# Patient Record
Sex: MALE | Race: WHITE | Employment: FULL TIME
[De-identification: names, ages, dates, MRNs, and addresses within clinical notes are randomized per-mention and may not be internally consistent; named-entity substitution may affect disease eponyms.]

---

## 2017-02-21 ENCOUNTER — TELEPHONE (OUTPATIENT)
Dept: FAMILY MEDICINE CLINIC | Facility: CLINIC | Age: 52
End: 2017-02-21

## 2017-02-24 RX ORDER — AMOXICILLIN 875 MG/1
875 TABLET, COATED ORAL 2 TIMES DAILY
Qty: 20 TABLET | Refills: 0 | Status: SHIPPED | OUTPATIENT
Start: 2017-02-24 | End: 2020-04-30 | Stop reason: SDUPTHER

## 2017-07-19 ENCOUNTER — OFFICE VISIT (OUTPATIENT)
Dept: FAMILY MEDICINE CLINIC | Age: 52
End: 2017-07-19
Payer: COMMERCIAL

## 2017-07-19 VITALS
RESPIRATION RATE: 18 BRPM | HEIGHT: 72 IN | TEMPERATURE: 98.6 F | WEIGHT: 203 LBS | HEART RATE: 80 BPM | DIASTOLIC BLOOD PRESSURE: 92 MMHG | BODY MASS INDEX: 27.5 KG/M2 | SYSTOLIC BLOOD PRESSURE: 117 MMHG

## 2017-07-19 DIAGNOSIS — F41.9 ANXIETY: ICD-10-CM

## 2017-07-19 DIAGNOSIS — Z13.220 SCREENING FOR LIPOID DISORDERS: ICD-10-CM

## 2017-07-19 DIAGNOSIS — R06.02 SOB (SHORTNESS OF BREATH): ICD-10-CM

## 2017-07-19 DIAGNOSIS — I49.9 IRREGULAR HEARTBEAT: Primary | ICD-10-CM

## 2017-07-19 DIAGNOSIS — Z11.59 NEED FOR HEPATITIS C SCREENING TEST: ICD-10-CM

## 2017-07-19 DIAGNOSIS — R09.89 ABNORMAL LUNG SOUNDS: ICD-10-CM

## 2017-07-19 DIAGNOSIS — Z12.5 SPECIAL SCREENING FOR MALIGNANT NEOPLASM OF PROSTATE: ICD-10-CM

## 2017-07-19 PROCEDURE — 99214 OFFICE O/P EST MOD 30 MIN: CPT | Performed by: NURSE PRACTITIONER

## 2017-07-19 PROCEDURE — 93000 ELECTROCARDIOGRAM COMPLETE: CPT | Performed by: NURSE PRACTITIONER

## 2017-07-19 RX ORDER — LORAZEPAM 0.5 MG/1
0.5 TABLET ORAL EVERY 8 HOURS PRN
Qty: 10 TABLET | Refills: 0 | Status: SHIPPED | OUTPATIENT
Start: 2017-07-19 | End: 2017-08-18

## 2017-07-19 RX ORDER — FLUTICASONE PROPIONATE 50 MCG
1 SPRAY, SUSPENSION (ML) NASAL DAILY
Qty: 3 BOTTLE | Refills: 3 | Status: SHIPPED | OUTPATIENT
Start: 2017-07-19 | End: 2020-10-22 | Stop reason: SDUPTHER

## 2017-07-19 RX ORDER — SILODOSIN 4 MG/1
4 CAPSULE ORAL DAILY
Qty: 90 CAPSULE | Refills: 3 | Status: SHIPPED | OUTPATIENT
Start: 2017-07-19 | End: 2018-04-16 | Stop reason: DRUGHIGH

## 2017-07-19 ASSESSMENT — ENCOUNTER SYMPTOMS
SHORTNESS OF BREATH: 0
CHEST TIGHTNESS: 0
VOMITING: 0
COUGH: 0
NAUSEA: 0
WHEEZING: 0
ABDOMINAL PAIN: 0

## 2017-08-30 LAB
ALBUMIN SERPL-MCNC: 4.1 G/DL
ALP BLD-CCNC: 102 U/L
ALT SERPL-CCNC: 29 U/L
ANION GAP SERPL CALCULATED.3IONS-SCNC: NORMAL MMOL/L
AST SERPL-CCNC: 22 U/L
BASOPHILS ABSOLUTE: NORMAL /ΜL
BASOPHILS RELATIVE PERCENT: NORMAL %
BILIRUB SERPL-MCNC: 0.6 MG/DL (ref 0.1–1.4)
BUN BLDV-MCNC: 14 MG/DL
CALCIUM SERPL-MCNC: 9.4 MG/DL
CHLORIDE BLD-SCNC: 105 MMOL/L
CHOLESTEROL, TOTAL: 198 MG/DL
CHOLESTEROL/HDL RATIO: 4.1
CO2: 29 MMOL/L
CREAT SERPL-MCNC: 0.94 MG/DL
EOSINOPHILS ABSOLUTE: NORMAL /ΜL
EOSINOPHILS RELATIVE PERCENT: NORMAL %
GFR CALCULATED: NORMAL
GLUCOSE BLD-MCNC: 98 MG/DL
HCT VFR BLD CALC: NORMAL % (ref 41–53)
HDLC SERPL-MCNC: 48 MG/DL (ref 35–70)
HEMOGLOBIN: NORMAL G/DL (ref 13.5–17.5)
LDL CHOLESTEROL CALCULATED: 119 MG/DL (ref 0–160)
LYMPHOCYTES ABSOLUTE: NORMAL /ΜL
LYMPHOCYTES RELATIVE PERCENT: NORMAL %
MCH RBC QN AUTO: NORMAL PG
MCHC RBC AUTO-ENTMCNC: NORMAL G/DL
MCV RBC AUTO: NORMAL FL
MONOCYTES ABSOLUTE: NORMAL /ΜL
MONOCYTES RELATIVE PERCENT: NORMAL %
NEUTROPHILS ABSOLUTE: NORMAL /ΜL
NEUTROPHILS RELATIVE PERCENT: NORMAL %
PDW BLD-RTO: NORMAL %
PLATELET # BLD: NORMAL K/ΜL
PMV BLD AUTO: NORMAL FL
POTASSIUM SERPL-SCNC: 4.6 MMOL/L
PROSTATE SPECIFIC ANTIGEN: 1.7 NG/ML
RBC # BLD: NORMAL 10^6/ΜL
SODIUM BLD-SCNC: 139 MMOL/L
TOTAL PROTEIN: 6.5
TRIGL SERPL-MCNC: 193 MG/DL
VLDLC SERPL CALC-MCNC: NORMAL MG/DL
WBC # BLD: NORMAL 10^3/ML

## 2018-04-16 ENCOUNTER — OFFICE VISIT (OUTPATIENT)
Dept: FAMILY MEDICINE CLINIC | Age: 53
End: 2018-04-16
Payer: COMMERCIAL

## 2018-04-16 VITALS
BODY MASS INDEX: 28.58 KG/M2 | HEART RATE: 78 BPM | SYSTOLIC BLOOD PRESSURE: 118 MMHG | DIASTOLIC BLOOD PRESSURE: 84 MMHG | TEMPERATURE: 98.2 F | HEIGHT: 72 IN | OXYGEN SATURATION: 97 % | WEIGHT: 211 LBS

## 2018-04-16 DIAGNOSIS — Z23 NEED FOR TDAP VACCINATION: ICD-10-CM

## 2018-04-16 DIAGNOSIS — K64.4 EXTERNAL HEMORRHOIDS WITHOUT COMPLICATION: Primary | ICD-10-CM

## 2018-04-16 DIAGNOSIS — N40.0 BENIGN PROSTATIC HYPERPLASIA, UNSPECIFIED WHETHER LOWER URINARY TRACT SYMPTOMS PRESENT: ICD-10-CM

## 2018-04-16 DIAGNOSIS — N52.9 ERECTILE DYSFUNCTION, UNSPECIFIED ERECTILE DYSFUNCTION TYPE: ICD-10-CM

## 2018-04-16 PROCEDURE — 90471 IMMUNIZATION ADMIN: CPT | Performed by: NURSE PRACTITIONER

## 2018-04-16 PROCEDURE — 90715 TDAP VACCINE 7 YRS/> IM: CPT | Performed by: NURSE PRACTITIONER

## 2018-04-16 PROCEDURE — 99213 OFFICE O/P EST LOW 20 MIN: CPT | Performed by: NURSE PRACTITIONER

## 2018-04-16 RX ORDER — SILODOSIN 8 MG/1
8 CAPSULE ORAL EVERY EVENING
Qty: 90 CAPSULE | Refills: 3 | Status: SHIPPED | OUTPATIENT
Start: 2018-04-16 | End: 2018-06-26 | Stop reason: SDUPTHER

## 2018-04-16 RX ORDER — DIAPER,BRIEF,INFANT-TODD,DISP
EACH MISCELLANEOUS
Qty: 1 TUBE | Refills: 1 | Status: SHIPPED | OUTPATIENT
Start: 2018-04-16 | End: 2018-04-23

## 2018-04-16 ASSESSMENT — ENCOUNTER SYMPTOMS
SHORTNESS OF BREATH: 0
DIARRHEA: 0
ABDOMINAL DISTENTION: 0
ANAL BLEEDING: 1
CONSTIPATION: 0
COUGH: 0
VOMITING: 0
ABDOMINAL PAIN: 0
BLOOD IN STOOL: 0
NAUSEA: 0
CHEST TIGHTNESS: 0

## 2018-04-16 ASSESSMENT — PATIENT HEALTH QUESTIONNAIRE - PHQ9
1. LITTLE INTEREST OR PLEASURE IN DOING THINGS: 0
SUM OF ALL RESPONSES TO PHQ QUESTIONS 1-9: 0
SUM OF ALL RESPONSES TO PHQ9 QUESTIONS 1 & 2: 0
2. FEELING DOWN, DEPRESSED OR HOPELESS: 0

## 2018-04-23 LAB — TESTOSTERONE TOTAL: 370

## 2018-04-24 DIAGNOSIS — N52.9 ERECTILE DYSFUNCTION, UNSPECIFIED ERECTILE DYSFUNCTION TYPE: ICD-10-CM

## 2018-06-26 RX ORDER — SILODOSIN 8 MG/1
8 CAPSULE ORAL EVERY EVENING
Qty: 90 CAPSULE | Refills: 3 | OUTPATIENT
Start: 2018-06-26

## 2018-06-27 RX ORDER — SILODOSIN 8 MG/1
8 CAPSULE ORAL EVERY EVENING
Qty: 90 CAPSULE | Refills: 3 | Status: SHIPPED | OUTPATIENT
Start: 2018-06-27 | End: 2019-06-07

## 2018-10-29 ENCOUNTER — OFFICE VISIT (OUTPATIENT)
Dept: FAMILY MEDICINE CLINIC | Age: 53
End: 2018-10-29
Payer: COMMERCIAL

## 2018-10-29 VITALS
HEIGHT: 72 IN | HEART RATE: 88 BPM | BODY MASS INDEX: 28.31 KG/M2 | OXYGEN SATURATION: 97 % | TEMPERATURE: 97.5 F | WEIGHT: 209 LBS | SYSTOLIC BLOOD PRESSURE: 112 MMHG | DIASTOLIC BLOOD PRESSURE: 80 MMHG

## 2018-10-29 DIAGNOSIS — Z12.5 SCREENING FOR PROSTATE CANCER: ICD-10-CM

## 2018-10-29 DIAGNOSIS — Z11.59 ENCOUNTER FOR HEPATITIS C SCREENING TEST FOR LOW RISK PATIENT: ICD-10-CM

## 2018-10-29 DIAGNOSIS — Z13.220 SCREENING FOR LIPID DISORDERS: ICD-10-CM

## 2018-10-29 DIAGNOSIS — Z00.00 ROUTINE GENERAL MEDICAL EXAMINATION AT A HEALTH CARE FACILITY: Primary | ICD-10-CM

## 2018-10-29 DIAGNOSIS — Z23 FLU VACCINE NEED: ICD-10-CM

## 2018-10-29 PROCEDURE — 90471 IMMUNIZATION ADMIN: CPT | Performed by: NURSE PRACTITIONER

## 2018-10-29 PROCEDURE — 90688 IIV4 VACCINE SPLT 0.5 ML IM: CPT | Performed by: NURSE PRACTITIONER

## 2018-10-29 PROCEDURE — 99213 OFFICE O/P EST LOW 20 MIN: CPT | Performed by: NURSE PRACTITIONER

## 2018-10-29 ASSESSMENT — ENCOUNTER SYMPTOMS
NAUSEA: 0
ABDOMINAL PAIN: 0
VOMITING: 0
COUGH: 0
CHEST TIGHTNESS: 0
CONSTIPATION: 0
SHORTNESS OF BREATH: 0
DIARRHEA: 0

## 2018-10-29 NOTE — PROGRESS NOTES
[address]  [place], [address]  [phone]      Ismael Viveros is a 52 y.o. male who presents today for his  medicalconditions/complaints as noted below. Ismael Viveros is c/o of Hypertension (last colonoscopy w/,not sure of date,will fax office)  . HPI:   Pt was traveling in [facility] end of this summer and had to go to an urgent care for diverticulitis and his BP was high. He was told to f/u with pcp, he has been very busy with work and was not able to get in for sooner appt. He did buy a otc wrist bp cuff and has been monitoring it at home and has been running high. He has no headaches, blurry vision, chest pain, weakness, fatigue, leg swelling. He does not take anything for BP and not on any supplements. No fm hx of htn. Past Medical History:   Diagnosis Date    Diverticulosis     Hayfever       Past Surgical History:   Procedure Laterality Date    COLONOSCOPY       Family History   Problem Relation Age of Onset    No Known Problems Mother     No Known Problems Father     Cancer Maternal Grandmother         colon     Social History   Substance Use Topics    Smoking status: Never Smoker    Smokeless tobacco: Never Used    Alcohol use Yes      Current Outpatient Prescriptions   Medication Sig Dispense Refill    silodosin (RAPAFLO) 8 MG CAPS Take 1 capsule by mouth every evening 90 capsule 3    fluticasone (FLONASE) 50 MCG/ACT nasal spray 1 spray by Nasal route daily 3 Bottle 3     No current facility-administered medications for this visit.       No Known Allergies    Health Maintenance   Topic Date Due    Hepatitis C screen  1965    HIV screen  06/07/1978    Shingles Vaccine (1 of 2 - 2 Dose Series) 06/07/2013    Colon cancer screen colonoscopy  06/07/2013    Flu vaccine (1) 09/01/2018    Lipid screen  08/29/2022    DTaP/Tdap/Td vaccine (2 - Td) 04/16/2028       Subjective:      Review of Systems   Constitutional: Negative for 139 08/29/2017    K 4.6 08/29/2017     08/29/2017    CO2 29 08/29/2017    BUN 14 08/29/2017    CREATININE 0.94 08/29/2017        Component Value Date/Time    CALCIUM 9.4 08/29/2017    ALKPHOS 102 08/29/2017    AST 22 08/29/2017    ALT 29 08/29/2017    BILITOT 0.6 08/29/2017            Plan:      Return in about 3 months (around 1/29/2019) for return for bp check/HTN. Orders Placed This Encounter   Procedures    INFLUENZA, QUADV, 3 YRS AND OLDER, IM, MDV, 0.5ML (FLUZONE QUADV)    Lipid, Fasting     Standing Status:   Future     Standing Expiration Date:   10/29/2019    Comprehensive Metabolic Panel     Standing Status:   Future     Standing Expiration Date:   10/29/2019    CBC     Standing Status:   Future     Standing Expiration Date:   10/29/2019    PSA Screening     Standing Status:   Future     Standing Expiration Date:   10/29/2019    Hepatitis C Antibody     Standing Status:   Future     Standing Expiration Date:   10/30/2019     Pt to bring his home bp cuff in this week for comparison with manual readings  Pt asymptomatic  Check labs  Will call with test results  Reduce any caffeine, salt within diet  Flu vaccine given, pt declines shingles vaccines      Patient given educational materials - see patient instructions. Discussed use,benefit, and side effects of prescribed medications. All patient questions answered. Pt voiced understanding. Reviewed health maintenance. Instructed to continue currentmedications, diet and exercise.     Electronically signed by Brock Vergara CNP on 10/29/2018 at 1:56 PM

## 2019-06-07 ENCOUNTER — OFFICE VISIT (OUTPATIENT)
Dept: FAMILY MEDICINE CLINIC | Age: 54
End: 2019-06-07
Payer: COMMERCIAL

## 2019-06-07 VITALS
HEART RATE: 76 BPM | WEIGHT: 208 LBS | TEMPERATURE: 95.5 F | OXYGEN SATURATION: 98 % | SYSTOLIC BLOOD PRESSURE: 118 MMHG | BODY MASS INDEX: 28.17 KG/M2 | DIASTOLIC BLOOD PRESSURE: 82 MMHG | HEIGHT: 72 IN

## 2019-06-07 DIAGNOSIS — R09.81 SINUS CONGESTION: Primary | ICD-10-CM

## 2019-06-07 DIAGNOSIS — Z12.11 SCREEN FOR COLON CANCER: ICD-10-CM

## 2019-06-07 PROCEDURE — 99213 OFFICE O/P EST LOW 20 MIN: CPT | Performed by: NURSE PRACTITIONER

## 2019-06-07 RX ORDER — PSEUDOEPHEDRINE HYDROCHLORIDE 30 MG/1
30 TABLET ORAL EVERY 6 HOURS PRN
Qty: 30 TABLET | Refills: 1 | Status: SHIPPED | OUTPATIENT
Start: 2019-06-07 | End: 2019-06-17

## 2019-06-07 RX ORDER — METHYLPREDNISOLONE 4 MG/1
TABLET ORAL
Qty: 1 KIT | Refills: 0 | Status: SHIPPED | OUTPATIENT
Start: 2019-06-07 | End: 2019-06-13

## 2019-06-07 RX ORDER — SILODOSIN 4 MG/1
CAPSULE ORAL
COMMUNITY
Start: 2019-05-24 | End: 2019-11-01 | Stop reason: SDUPTHER

## 2019-06-07 ASSESSMENT — ENCOUNTER SYMPTOMS
SINUS COMPLAINT: 1
HOARSE VOICE: 0
TROUBLE SWALLOWING: 0
ABDOMINAL PAIN: 0
SORE THROAT: 0
SINUS PRESSURE: 1
WHEEZING: 0
RHINORRHEA: 0
CHEST TIGHTNESS: 0
COUGH: 0
SHORTNESS OF BREATH: 0

## 2019-06-07 ASSESSMENT — PATIENT HEALTH QUESTIONNAIRE - PHQ9
SUM OF ALL RESPONSES TO PHQ QUESTIONS 1-9: 0
2. FEELING DOWN, DEPRESSED OR HOPELESS: 0
SUM OF ALL RESPONSES TO PHQ9 QUESTIONS 1 & 2: 0
SUM OF ALL RESPONSES TO PHQ QUESTIONS 1-9: 0
1. LITTLE INTEREST OR PLEASURE IN DOING THINGS: 0

## 2019-06-07 NOTE — PROGRESS NOTES
[facility] SPECIALTY [facility] [place]  [address]  [place], [address]  [phone]      Rizwana Davis is a 62 y.o. male who presents today for his  medicalconditions/complaints as noted below. Rizwana Davis is c/o of Sinus Problem (head pressure,congestion,foul breath,using nasal spray for allergies) and Health Assessment (last rpt of colonoscopy from [address] [place] [address] was 2007 per office (under media))  . HPI:    Having no sinus drainage, just more pressure and foul breath. Sinus Problem   This is a new problem. The current episode started in the past 7 days. The problem has been waxing and waning since onset. There has been no fever. He is experiencing no pain. Associated symptoms include congestion, headaches, sinus pressure and sneezing. Pertinent negatives include no chills, coughing, diaphoresis, ear pain, hoarse voice, neck pain, shortness of breath or sore throat. (Allergy symptoms also, he does not feel ill) Past treatments include saline sprays, lying down and acetaminophen (flonase). The treatment provided no relief. Past Medical History:   Diagnosis Date    Diverticulosis     Hayfever       Past Surgical History:   Procedure Laterality Date    COLONOSCOPY       Family History   Problem Relation Age of Onset    No Known Problems Mother     No Known Problems Father     Cancer Maternal Grandmother         colon     Social History     Tobacco Use    Smoking status: Never Smoker    Smokeless tobacco: Never Used   Substance Use Topics    Alcohol use: Yes      Current Outpatient Medications   Medication Sig Dispense Refill    silodosin (RAPAFLO) 4 MG CAPS capsule       pseudoephedrine (DECONGESTANT) 30 MG tablet Take 1 tablet by mouth every 6 hours as needed for Congestion 30 tablet 1    methylPREDNISolone (MEDROL DOSEPACK) 4 MG tablet Take by mouth.  1 kit 0    fluticasone (FLONASE) 50 MCG/ACT nasal spray 1 spray by Nasal route daily 3 Bottle 3     No current facility-administered Mouth/Throat: Uvula is midline, oropharynx is clear and moist and mucous membranes are normal. No oropharyngeal exudate. Neck: Normal range of motion. Neck supple. Cardiovascular: Normal rate, regular rhythm and normal heart sounds. Pulmonary/Chest: Effort normal and breath sounds normal. No respiratory distress. He has no wheezes. Lymphadenopathy:     He has no cervical adenopathy. Neurological: He is alert and oriented to person, place, and time. Skin: Skin is warm and dry. Capillary refill takes less than 2 seconds. No rash noted. He is not diaphoretic. Psychiatric: He has a normal mood and affect. His behavior is normal. Judgment and thought content normal.   Nursing note and vitals reviewed. Assessment:       Diagnosis Orders   1. Sinus congestion  pseudoephedrine (DECONGESTANT) 30 MG tablet    methylPREDNISolone (MEDROL DOSEPACK) 4 MG tablet   2. Screen for colon cancer  COLONOSCOPY W/ OR W/O BIOPSY       Plan:      No follow-ups on file. Orders Placed This Encounter   Procedures    COLONOSCOPY W/ OR W/O BIOPSY     Standing Status:   Future     Standing Expiration Date:   12/7/2019     Order Specific Question:   Screening or Diagnostic? Answer:   Screening     Orders Placed This Encounter   Medications    pseudoephedrine (DECONGESTANT) 30 MG tablet     Sig: Take 1 tablet by mouth every 6 hours as needed for Congestion     Dispense:  30 tablet     Refill:  1    methylPREDNISolone (MEDROL DOSEPACK) 4 MG tablet     Sig: Take by mouth. Dispense:  1 kit     Refill:  0     Appears viral/allergy related  Will trial steroid pack and decongestants  Continue Flonase and increase saline rinses to BID  Call INB or worsening  Good oral hygiene  Health Maintenance reviewed - patient asked to schedule colonoscopy. Patient given educational materials - see patient instructions. Discussed use,benefit, and side effects of prescribed medications. All patient questions answered. Pt voiced understanding. Reviewed health maintenance. Instructed to continue currentmedications, diet and exercise.     Electronically signed by Angelica Vergara CNP on 6/7/2019 at 11:01 AM

## 2019-07-08 ENCOUNTER — ANESTHESIA EVENT (OUTPATIENT)
Dept: OPERATING ROOM | Age: 54
End: 2019-07-08
Payer: COMMERCIAL

## 2019-07-08 ENCOUNTER — HOSPITAL ENCOUNTER (OUTPATIENT)
Age: 54
Setting detail: OUTPATIENT SURGERY
Discharge: HOME | End: 2019-07-08
Attending: SURGERY | Admitting: SURGERY
Payer: COMMERCIAL

## 2019-07-08 ENCOUNTER — ANESTHESIA (OUTPATIENT)
Dept: OPERATING ROOM | Age: 54
End: 2019-07-08
Payer: COMMERCIAL

## 2019-07-08 VITALS
BODY MASS INDEX: 27.53 KG/M2 | DIASTOLIC BLOOD PRESSURE: 86 MMHG | OXYGEN SATURATION: 97 % | HEIGHT: 72 IN | SYSTOLIC BLOOD PRESSURE: 123 MMHG | WEIGHT: 203.26 LBS | RESPIRATION RATE: 19 BRPM | TEMPERATURE: 98.1 F | HEART RATE: 80 BPM

## 2019-07-08 VITALS — SYSTOLIC BLOOD PRESSURE: 154 MMHG | DIASTOLIC BLOOD PRESSURE: 109 MMHG | OXYGEN SATURATION: 100 %

## 2019-07-08 PROCEDURE — 2580000003 HC RX 258: Performed by: ANESTHESIOLOGY

## 2019-07-08 PROCEDURE — 7100000040 HC SPAR PHASE II RECOVERY - FIRST 15 MIN: Performed by: SURGERY

## 2019-07-08 PROCEDURE — 7100000000 HC PACU RECOVERY - FIRST 15 MIN: Performed by: SURGERY

## 2019-07-08 PROCEDURE — 6360000002 HC RX W HCPCS

## 2019-07-08 PROCEDURE — 3700000000 HC ANESTHESIA ATTENDED CARE: Performed by: SURGERY

## 2019-07-08 PROCEDURE — 3700000001 HC ADD 15 MINUTES (ANESTHESIA): Performed by: SURGERY

## 2019-07-08 PROCEDURE — 3609027000 HC COLONOSCOPY: Performed by: SURGERY

## 2019-07-08 PROCEDURE — 6360000002 HC RX W HCPCS: Performed by: NURSE ANESTHETIST, CERTIFIED REGISTERED

## 2019-07-08 PROCEDURE — 7100000001 HC PACU RECOVERY - ADDTL 15 MIN: Performed by: SURGERY

## 2019-07-08 PROCEDURE — 7100000041 HC SPAR PHASE II RECOVERY - ADDTL 15 MIN: Performed by: SURGERY

## 2019-07-08 RX ORDER — MIDAZOLAM HYDROCHLORIDE 1 MG/ML
INJECTION INTRAMUSCULAR; INTRAVENOUS
Status: COMPLETED
Start: 2019-07-08 | End: 2019-07-08

## 2019-07-08 RX ORDER — PROPOFOL 10 MG/ML
INJECTION, EMULSION INTRAVENOUS PRN
Status: DISCONTINUED | OUTPATIENT
Start: 2019-07-08 | End: 2019-07-08 | Stop reason: SDUPTHER

## 2019-07-08 RX ORDER — SODIUM CHLORIDE 0.9 % (FLUSH) 0.9 %
10 SYRINGE (ML) INJECTION EVERY 12 HOURS SCHEDULED
Status: DISCONTINUED | OUTPATIENT
Start: 2019-07-08 | End: 2019-07-08 | Stop reason: HOSPADM

## 2019-07-08 RX ORDER — ONDANSETRON 2 MG/ML
4 INJECTION INTRAMUSCULAR; INTRAVENOUS ONCE
Status: DISCONTINUED | OUTPATIENT
Start: 2019-07-08 | End: 2019-07-08 | Stop reason: HOSPADM

## 2019-07-08 RX ORDER — SODIUM CHLORIDE 0.9 % (FLUSH) 0.9 %
10 SYRINGE (ML) INJECTION PRN
Status: DISCONTINUED | OUTPATIENT
Start: 2019-07-08 | End: 2019-07-08 | Stop reason: HOSPADM

## 2019-07-08 RX ORDER — MIDAZOLAM HYDROCHLORIDE 1 MG/ML
2 INJECTION INTRAMUSCULAR; INTRAVENOUS
Status: COMPLETED | OUTPATIENT
Start: 2019-07-08 | End: 2019-07-08

## 2019-07-08 RX ORDER — SODIUM CHLORIDE, SODIUM LACTATE, POTASSIUM CHLORIDE, CALCIUM CHLORIDE 600; 310; 30; 20 MG/100ML; MG/100ML; MG/100ML; MG/100ML
INJECTION, SOLUTION INTRAVENOUS CONTINUOUS
Status: DISCONTINUED | OUTPATIENT
Start: 2019-07-08 | End: 2019-07-08 | Stop reason: HOSPADM

## 2019-07-08 RX ORDER — ALBUTEROL SULFATE 2.5 MG/3ML
2.5 SOLUTION RESPIRATORY (INHALATION) EVERY 6 HOURS PRN
Status: DISCONTINUED | OUTPATIENT
Start: 2019-07-08 | End: 2019-07-08 | Stop reason: HOSPADM

## 2019-07-08 RX ORDER — ALBUTEROL SULFATE 2.5 MG/3ML
SOLUTION RESPIRATORY (INHALATION)
Status: COMPLETED
Start: 2019-07-08 | End: 2019-07-08

## 2019-07-08 RX ORDER — PROPOFOL 10 MG/ML
INJECTION, EMULSION INTRAVENOUS CONTINUOUS PRN
Status: DISCONTINUED | OUTPATIENT
Start: 2019-07-08 | End: 2019-07-08 | Stop reason: SDUPTHER

## 2019-07-08 RX ORDER — PROPOFOL 10 MG/ML
INJECTION, EMULSION INTRAVENOUS CONTINUOUS PRN
Status: DISCONTINUED | OUTPATIENT
Start: 2019-07-08 | End: 2019-07-08

## 2019-07-08 RX ADMIN — SODIUM CHLORIDE, POTASSIUM CHLORIDE, SODIUM LACTATE AND CALCIUM CHLORIDE: 600; 310; 30; 20 INJECTION, SOLUTION INTRAVENOUS at 07:29

## 2019-07-08 RX ADMIN — MIDAZOLAM HYDROCHLORIDE 2 MG: 1 INJECTION, SOLUTION INTRAMUSCULAR; INTRAVENOUS at 07:27

## 2019-07-08 RX ADMIN — PROPOFOL 50 MG: 10 INJECTION, EMULSION INTRAVENOUS at 07:37

## 2019-07-08 RX ADMIN — ALBUTEROL SULFATE 2.5 MG: 2.5 SOLUTION RESPIRATORY (INHALATION) at 09:34

## 2019-07-08 RX ADMIN — MIDAZOLAM HYDROCHLORIDE 2 MG: 1 INJECTION INTRAMUSCULAR; INTRAVENOUS at 07:27

## 2019-07-08 RX ADMIN — PROPOFOL 200 MCG/KG/MIN: 10 INJECTION, EMULSION INTRAVENOUS at 07:38

## 2019-07-08 ASSESSMENT — PAIN SCALES - GENERAL
PAINLEVEL_OUTOF10: 3
PAINLEVEL_OUTOF10: 2
PAINLEVEL_OUTOF10: 3

## 2019-07-08 ASSESSMENT — PULMONARY FUNCTION TESTS
PIF_VALUE: 0
PIF_VALUE: 0
PIF_VALUE: 3
PIF_VALUE: 0
PIF_VALUE: 0
PIF_VALUE: 14
PIF_VALUE: 0
PIF_VALUE: 8
PIF_VALUE: 14
PIF_VALUE: 14
PIF_VALUE: 0
PIF_VALUE: 0
PIF_VALUE: 2
PIF_VALUE: 0
PIF_VALUE: 2
PIF_VALUE: 2
PIF_VALUE: 0
PIF_VALUE: 14
PIF_VALUE: 0
PIF_VALUE: 2
PIF_VALUE: 0
PIF_VALUE: 2
PIF_VALUE: 0
PIF_VALUE: 0
PIF_VALUE: 15
PIF_VALUE: 0
PIF_VALUE: 5
PIF_VALUE: 3
PIF_VALUE: 13
PIF_VALUE: 3
PIF_VALUE: 5
PIF_VALUE: 12
PIF_VALUE: 0
PIF_VALUE: 12
PIF_VALUE: 4
PIF_VALUE: 13
PIF_VALUE: 0
PIF_VALUE: 1
PIF_VALUE: 2
PIF_VALUE: 0
PIF_VALUE: 15
PIF_VALUE: 0
PIF_VALUE: 3
PIF_VALUE: 0
PIF_VALUE: 14
PIF_VALUE: 0
PIF_VALUE: 0
PIF_VALUE: 13
PIF_VALUE: 0
PIF_VALUE: 24
PIF_VALUE: 0
PIF_VALUE: 14
PIF_VALUE: 19
PIF_VALUE: 6

## 2019-07-08 ASSESSMENT — PAIN DESCRIPTION - LOCATION
LOCATION: ABDOMEN

## 2019-07-08 ASSESSMENT — PAIN DESCRIPTION - DESCRIPTORS
DESCRIPTORS: CRAMPING

## 2019-07-08 ASSESSMENT — ENCOUNTER SYMPTOMS
GASTROINTESTINAL NEGATIVE: 1
ALLERGIC/IMMUNOLOGIC NEGATIVE: 1
RESPIRATORY NEGATIVE: 1
EYES NEGATIVE: 1

## 2019-07-08 ASSESSMENT — PAIN DESCRIPTION - PAIN TYPE
TYPE: OTHER (COMMENT)
TYPE: ACUTE PAIN

## 2019-07-08 ASSESSMENT — PAIN DESCRIPTION - FREQUENCY
FREQUENCY: INTERMITTENT

## 2019-07-08 ASSESSMENT — PAIN - FUNCTIONAL ASSESSMENT
PAIN_FUNCTIONAL_ASSESSMENT: 0-10
PAIN_FUNCTIONAL_ASSESSMENT: 0-10

## 2019-07-08 ASSESSMENT — PAIN DESCRIPTION - ORIENTATION
ORIENTATION: LOWER;MID
ORIENTATION: LOWER;MID
ORIENTATION: MID;LOWER

## 2019-07-08 NOTE — ANESTHESIA PRE PROCEDURE
vascular ROS. Anesthesia Plan      MAC     ASA 2       Induction: intravenous. MIPS: Postoperative opioids intended and Prophylactic antiemetics administered. Anesthetic plan and risks discussed with patient. Plan discussed with CRNA.     Attending anesthesiologist reviewed and agrees with [address] Dayana Gomez MD   7/8/2019

## 2019-07-08 NOTE — PROGRESS NOTES
Dr. Shaji Karimi notified of patients lower lobe wheezing, normal sats and cough. Patient instructed to use Flonase when gets home. May discharge to home.

## 2019-07-08 NOTE — PROGRESS NOTES
Subjective:   H&P  General Surgery        Pt Name: Mortimer Sides  MRN: [id number]  YOB: 1965  Date of evaluation: 7/8/2019      [x] I have examined the patient and reviewed the H&P/Consult completed 7/16/19, and there are no changes to the patient or plans. [] I have examined the patient and reviewed the H&P/Consult and have noted the following changes:     CC: None    Pt is here for screening colonoscopy. Pt has no complaints and admits to only minor blood streaking on toilet paper occasionally after BM, pt state Dr. Raysa Pryor is aware. Pt denies any use of anticoagulants or history of easy bruising or bleeding. Pt denies changes in stool consistency or calibre. Pt denies N/V. Pt states he tolerated the bowel prep well. Gen: AOx3, NAD, well nourished  HEENT: sclera anicteric, NCAT  Neck: Supple, no tracheal deviation, no thyromegaly  CV: RRR  Resp: CTA bilaterally, equal chest rise and fall  Abd, ND/NT, soft, flat  Extremities: no cyanosis, clubbing or rashes note, peripheral pulses 2+  Skin: war, dry, without rashes or lesions    I have included the previous office H&P below:      Electronically signed by Lily Mahajan DO on 7/8/2019 at 7:12 AM              Patient ID: Mortimer Sides is a 62 y.o. male. He is due for a colonoscopy. Has hx of diverticulitis >10 yrs ago and no further episodes. Last cscope in 2007. BM's fine except 1 brief episode recently after eating a bucket of popcorn. Fhx of CRC in MGM, no 1st degree relatives. No dietary issues though in general is carefule with seeds, nuts, popcorn. HPI    Review of Systems   Constitutional: Negative. HENT: Negative. Eyes: Negative. Respiratory: Negative. Cardiovascular: Negative. Hx atrial fib   Gastrointestinal: Negative. Hemorrhoids   Endocrine: Negative. Genitourinary: Negative. Musculoskeletal: Negative. Allergic/Immunologic: Negative. Neurological: Positive for headaches. Psychiatric/Behavioral: Negative. Objective:   Physical Exam   Constitutional: He is oriented to person, place, and time. He appears well-developed and well-nourished. HENT:   Head: Normocephalic and atraumatic. Eyes: Conjunctivae and EOM are normal.   Cardiovascular: Normal rate, regular rhythm and normal heart sounds. Pulmonary/Chest: Effort normal and breath sounds normal.   Abdominal: Soft. He exhibits no distension. There is no tenderness. Musculoskeletal: Normal range of motion. Neurological: He is alert and oriented to person, place, and time. Skin: Skin is warm and dry. Assessment:      Here for colonoscopy for CRC screening      Plan: We reviewed the procedure, risks and prep and Jabier Lowery would like to proceed. Body mass index is 27.57 kg/m². Vitals:    07/08/19 0624   BP: (!) 138/103   Pulse:    Resp:    Temp:    SpO2:      No Known Allergies  Family History   Problem Relation Age of Onset    No Known Problems Mother     No Known Problems Father     Cancer Maternal Grandmother         colon     Social History     Socioeconomic History    Marital status:      Spouse name: Not on file    Number of children: Not on file    Years of education: Not on file    Highest education level: Not on file   Occupational History    Not on file   Social Needs    Financial resource strain: Not on file    Food insecurity:     Worry: Not on file     Inability: Not on file    Transportation needs:     Medical: Not on file     Non-medical: Not on file   Tobacco Use    Smoking status: Never Smoker    Smokeless tobacco: Never Used   Substance and Sexual Activity    Alcohol use:  Yes    Drug use: Not on file    Sexual activity: Not on file   Lifestyle    Physical activity:     Days per week: Not on file     Minutes per session: Not on file    Stress: Not on file   Relationships    Social connections:     Talks on phone: Not on file     Gets together: Not on file

## 2019-07-08 NOTE — OP NOTE
Operative Note  ______________________________________________________________    Patient: Kenisha Mathews  YOB: 1965  MRN: [id number]  Date of Procedure: 7/8/2019    Pre-Op Diagnosis: SCREENING colonoscopy      Post-Op Diagnosis: Same; severe diverticulosis at sigmoid colon; external hemorrhoids        Procedure(s):  Colonoscopy      Anesthesia: MAC converted to general     Surgeon(s):  Jaida Saleh DO     Assistant: Yamilet Cortez PGY-5, Annette Mary PGY-1, Maria R Adams MS-4     Estimated Blood Loss (mL): less than 5ml     Complications: bronchospasms requiring conversion to general anesthesia. Airway intact during the entire procedure without any significant desaturations.      Specimens:   None obtained      Implants:  * No implants in log *      Drains: * No LDAs found *     Findings:   Wound Heath: IV. Good prep. External hemorrhoids. Tortuous colon with severe diverticulosis at sigmoid colon. Cecum reached and confirmed with visualization and external palpation. Indications: This is a 61 y/o male who presents for a screening colonoscopy. His PMH includes history of diverticulosis. He reports the colonoscopy prep was completed without issue. The risks and benefits of the procedure were discussed in detail and signed consent was obtained. Operative Details: The patient was brought to the OR and left on the stretcher. MAC anesthesia was induced per anesthesia. The patient was given supplemental O2 via nasal cannula. LINDSAY revealed external hemorrhoids. Rectal tone was relaxed. There were no masses and no gross blood noted. The colonoscope was inserted per rectum and advanced under direct vision to the cecum with minimal difficulty due to his tortuous colon. His bowel prep was good. During the procedure, he developed bronchospams. Anesthesia converted to a general anesthetic. His airway remained patent during the entire procedure.    Cecum/Ascending colon: normal    Transverse colon:

## 2019-07-10 ENCOUNTER — OFFICE VISIT (OUTPATIENT)
Dept: FAMILY MEDICINE CLINIC | Age: 54
End: 2019-07-10
Payer: COMMERCIAL

## 2019-07-10 VITALS
WEIGHT: 203 LBS | TEMPERATURE: 98.8 F | HEART RATE: 88 BPM | BODY MASS INDEX: 27.5 KG/M2 | HEIGHT: 72 IN | SYSTOLIC BLOOD PRESSURE: 118 MMHG | DIASTOLIC BLOOD PRESSURE: 88 MMHG | OXYGEN SATURATION: 97 %

## 2019-07-10 DIAGNOSIS — R09.89 ABNORMAL LUNG SOUNDS: ICD-10-CM

## 2019-07-10 DIAGNOSIS — R05.9 COUGH: Primary | ICD-10-CM

## 2019-07-10 PROCEDURE — 99213 OFFICE O/P EST LOW 20 MIN: CPT | Performed by: NURSE PRACTITIONER

## 2019-07-10 RX ORDER — AZITHROMYCIN 250 MG/1
250 TABLET, FILM COATED ORAL SEE ADMIN INSTRUCTIONS
Qty: 6 TABLET | Refills: 0 | Status: SHIPPED | OUTPATIENT
Start: 2019-07-10 | End: 2021-03-26 | Stop reason: SDUPTHER

## 2019-07-10 ASSESSMENT — ENCOUNTER SYMPTOMS
WHEEZING: 0
CHEST TIGHTNESS: 0
SHORTNESS OF BREATH: 0
SORE THROAT: 0
ABDOMINAL PAIN: 0
RHINORRHEA: 0
CHOKING: 0
COUGH: 1
SINUS PRESSURE: 0
TROUBLE SWALLOWING: 0

## 2019-07-12 NOTE — H&P
Subjective:   H&P  General Surgery           Pt Name: Alex Giles  MRN: [id number]  YOB: 1965  Date of evaluation: 7/8/2019        [x] I have examined the patient and reviewed the H&P/Consult completed 7/16/19, and there are no changes to the patient or plans.      [] I have examined the patient and reviewed the H&P/Consult and have noted the following changes:      CC: None     Pt is here for screening colonoscopy. Pt has no complaints and admits to only minor blood streaking on toilet paper occasionally after BM, pt state Dr. Nish Gao is aware. Pt denies any use of anticoagulants or history of easy bruising or bleeding. Pt denies changes in stool consistency or calibre. Pt denies N/V. Pt states he tolerated the bowel prep well.      Gen: AOx3, NAD, well nourished  HEENT: sclera anicteric, NCAT  Neck: Supple, no tracheal deviation, no thyromegaly  CV: RRR  Resp: CTA bilaterally, equal chest rise and fall  Abd, ND/NT, soft, flat  Extremities: no cyanosis, clubbing or rashes note, peripheral pulses 2+  Skin: war, dry, without rashes or lesions     I have included the previous office H&P below:        Electronically signed by Nancy Lynch DO on 7/8/2019 at 7:12 AM                 Patient ID: Alex Giles is a 62 y.o. male. He is due for a colonoscopy. Has hx of diverticulitis >10 yrs ago and no further episodes. Last cscope in 2007. BM's fine except 1 brief episode recently after eating a bucket of popcorn. Fhx of CRC in MGM, no 1st degree relatives. No dietary issues though in general is carefule with seeds, nuts, popcorn.     HPI     Review of Systems   Constitutional: Negative. HENT: Negative. Eyes: Negative. Respiratory: Negative. Cardiovascular: Negative. Hx atrial fib   Gastrointestinal: Negative. Hemorrhoids   Endocrine: Negative. Genitourinary: Negative. Musculoskeletal: Negative. Allergic/Immunologic: Negative.     Neurological: Positive for

## 2019-10-31 LAB
CHOLESTEROL, TOTAL: 194 MG/DL
CHOLESTEROL/HDL RATIO: 3.5
HDLC SERPL-MCNC: 56 MG/DL (ref 35–70)
LDL CHOLESTEROL CALCULATED: 113 MG/DL (ref 0–160)
TRIGL SERPL-MCNC: 139 MG/DL
VLDLC SERPL CALC-MCNC: NORMAL MG/DL

## 2019-11-01 ENCOUNTER — OFFICE VISIT (OUTPATIENT)
Dept: FAMILY MEDICINE CLINIC | Age: 54
End: 2019-11-01
Payer: COMMERCIAL

## 2019-11-01 VITALS
HEART RATE: 83 BPM | DIASTOLIC BLOOD PRESSURE: 85 MMHG | HEIGHT: 72 IN | TEMPERATURE: 98.3 F | WEIGHT: 217 LBS | BODY MASS INDEX: 29.39 KG/M2 | SYSTOLIC BLOOD PRESSURE: 125 MMHG | OXYGEN SATURATION: 98 %

## 2019-11-01 DIAGNOSIS — N40.0 BENIGN PROSTATIC HYPERPLASIA WITHOUT LOWER URINARY TRACT SYMPTOMS: Primary | ICD-10-CM

## 2019-11-01 DIAGNOSIS — Z23 FLU VACCINE NEED: ICD-10-CM

## 2019-11-01 PROCEDURE — 90471 IMMUNIZATION ADMIN: CPT | Performed by: NURSE PRACTITIONER

## 2019-11-01 PROCEDURE — 99213 OFFICE O/P EST LOW 20 MIN: CPT | Performed by: NURSE PRACTITIONER

## 2019-11-01 PROCEDURE — 90686 IIV4 VACC NO PRSV 0.5 ML IM: CPT | Performed by: NURSE PRACTITIONER

## 2019-11-01 RX ORDER — SILODOSIN 4 MG/1
4 CAPSULE ORAL EVERY EVENING
Qty: 90 CAPSULE | Refills: 3 | Status: SHIPPED | OUTPATIENT
Start: 2019-11-01 | End: 2020-03-13 | Stop reason: SDUPTHER

## 2019-11-01 ASSESSMENT — ENCOUNTER SYMPTOMS
ABDOMINAL PAIN: 0
COUGH: 0
NAUSEA: 0
CHEST TIGHTNESS: 0
VOMITING: 0
SHORTNESS OF BREATH: 0

## 2020-03-13 ENCOUNTER — OFFICE VISIT (OUTPATIENT)
Dept: FAMILY MEDICINE CLINIC | Age: 55
End: 2020-03-13
Payer: COMMERCIAL

## 2020-03-13 VITALS
DIASTOLIC BLOOD PRESSURE: 72 MMHG | HEIGHT: 72 IN | HEART RATE: 89 BPM | BODY MASS INDEX: 28.58 KG/M2 | WEIGHT: 211 LBS | OXYGEN SATURATION: 97 % | SYSTOLIC BLOOD PRESSURE: 118 MMHG

## 2020-03-13 PROBLEM — N40.0 BENIGN PROSTATIC HYPERPLASIA WITHOUT LOWER URINARY TRACT SYMPTOMS: Status: ACTIVE | Noted: 2020-03-13

## 2020-03-13 PROCEDURE — 99213 OFFICE O/P EST LOW 20 MIN: CPT | Performed by: NURSE PRACTITIONER

## 2020-03-13 RX ORDER — SILODOSIN 4 MG/1
4 CAPSULE ORAL EVERY EVENING
Qty: 90 CAPSULE | Refills: 3 | Status: SHIPPED | OUTPATIENT
Start: 2020-03-13 | End: 2021-11-18 | Stop reason: SDUPTHER

## 2020-03-13 ASSESSMENT — ENCOUNTER SYMPTOMS
COUGH: 0
CHEST TIGHTNESS: 0
VOMITING: 0
NAUSEA: 0
SHORTNESS OF BREATH: 0
ABDOMINAL PAIN: 0

## 2020-03-13 ASSESSMENT — PATIENT HEALTH QUESTIONNAIRE - PHQ9
SUM OF ALL RESPONSES TO PHQ QUESTIONS 1-9: 0
1. LITTLE INTEREST OR PLEASURE IN DOING THINGS: 0
SUM OF ALL RESPONSES TO PHQ9 QUESTIONS 1 & 2: 0
SUM OF ALL RESPONSES TO PHQ QUESTIONS 1-9: 0
2. FEELING DOWN, DEPRESSED OR HOPELESS: 0

## 2020-03-13 NOTE — PROGRESS NOTES
[facility] SPECIALTY [facility] [place]  [address]  [place], [address]  [phone]      Yunier Garcia is a 62 y.o. male who presents today for his  medicalconditions/complaints as noted below. Yunier Garcia is c/o of Benign Prostatic Hypertrophy (Patient here for follow up on prostate and med refills)  . HPI:    Pt. Here refill on rapaflo. Wife states bottle states no refills. He is urinating well with no BPH symptoms. He is sleeping well. He has not other cocerns. Past Medical History:   Diagnosis Date    Bronchial spasms 07/08/2019    MAC to LMA with Colonoscopy    Diverticulosis     Hayfever     Hemorrhoid     Sinus infection     finished steroids 2 weeks ago/ now resolved      Past Surgical History:   Procedure Laterality Date    COLONOSCOPY  07/08/2019    Diagnosis: Same; severe diverticulosis at sigmoid colon; external hemorrhoids     COLONOSCOPY N/A 7/8/2019    SCREENING COLONOSCOPY performed by Ratna [facility]ering DO at [facility] History   Problem Relation Age of Onset    No Known Problems Mother     No Known Problems Father     Cancer Maternal Grandmother         colon     Social History     Tobacco Use    Smoking status: Never Smoker    Smokeless tobacco: Never Used   Substance Use Topics    Alcohol use: Yes      Current Outpatient Medications   Medication Sig Dispense Refill    silodosin (RAPAFLO) 4 MG CAPS capsule Take 1 capsule by mouth every evening 90 capsule 3    fluticasone (FLONASE) 50 MCG/ACT nasal spray 1 spray by Nasal route daily 3 Bottle 3     No current facility-administered medications for this visit.       No Known Allergies    Health Maintenance   Topic Date Due    Hepatitis C screen  1965    HIV screen  06/07/2020 (Originally 6/7/1978)    Shingles Vaccine (1 of 2) 11/01/2020 (Originally 6/7/2013)    Lipid screen  10/31/2024    DTaP/Tdap/Td vaccine (2 - Td) 04/16/2028    Colon cancer screen colonoscopy  07/08/2029    Flu vaccine Chemistry        Component Value Date/Time     08/29/2017    K 4.6 08/29/2017     08/29/2017    CO2 29 08/29/2017    BUN 14 08/29/2017    CREATININE 0.94 08/29/2017        Component Value Date/Time    CALCIUM 9.4 08/29/2017    ALKPHOS 102 08/29/2017    AST 22 08/29/2017    ALT 29 08/29/2017    BILITOT 0.6 08/29/2017          Plan:      Return if symptoms worsen or fail to improve. Refill sent over again, was done for 1 year in november? Continue daily  Patient advised to follow heart healthy, low fat  diet and 150 minutes of cardiovascular exercise per week     Orders Placed This Encounter   Medications    silodosin (RAPAFLO) 4 MG CAPS capsule     Sig: Take 1 capsule by mouth every evening     Dispense:  90 capsule     Refill:  3       Patient given educational materials - see patient instructions. Discussed use,benefit, and side effects of prescribed medications. All patient questions answered. Pt voiced understanding. Reviewed health maintenance. Instructed to continue currentmedications, diet and exercise.     Electronically signed by Janene Vergara CNP on 3/13/2020 at 2:28 PM

## 2020-03-13 NOTE — PROGRESS NOTES
Visit Information    Have you changed or started any medications since your last visit including any over-the-counter medicines, vitamins, or herbal medicines? no   Have you stopped taking any of your medications? Is so, why? -  no  Are you having any side effects from any of your medications? - no    Have you seen any other physician or provider since your last visit?  no   Have you had any other diagnostic tests since your last visit?  no   Have you been seen in the emergency room and/or had an admission in a hospital since we last saw you?  no   Have you had your routine dental cleaning in the past 6 months?  no     Do you have an active MyChart account? If no, what is the barrier?   Yes    Patient Care Team:  VIJAYA Chowdhury CNP as PCP - General (Nurse Practitioner)  VIJAYA Chowdhury CNP as PCP - [facility]    Medical History Review  Past Medical, Family, and Social History reviewed and does contribute to the patient presenting condition    Health Maintenance   Topic Date Due    Hepatitis C screen  1965    HIV screen  06/07/2020 (Originally 6/7/1978)    Shingles Vaccine (1 of 2) 11/01/2020 (Originally 6/7/2013)    Lipid screen  10/31/2024    DTaP/Tdap/Td vaccine (2 - Td) 04/16/2028    Colon cancer screen colonoscopy  07/08/2029    Flu vaccine  Completed    Hepatitis A vaccine  Aged Out    Hepatitis B vaccine  Aged Out    Hib vaccine  Aged Out    Meningococcal (ACWY) vaccine  Aged Out    Pneumococcal 0-64 years Vaccine  Aged Out

## 2020-04-30 ENCOUNTER — TELEMEDICINE (OUTPATIENT)
Dept: FAMILY MEDICINE CLINIC | Age: 55
End: 2020-04-30
Payer: COMMERCIAL

## 2020-04-30 VITALS — HEIGHT: 72 IN | BODY MASS INDEX: 28.58 KG/M2 | WEIGHT: 211 LBS | RESPIRATION RATE: 16 BRPM

## 2020-04-30 PROCEDURE — 99213 OFFICE O/P EST LOW 20 MIN: CPT | Performed by: NURSE PRACTITIONER

## 2020-04-30 RX ORDER — AMOXICILLIN 875 MG/1
875 TABLET, COATED ORAL 2 TIMES DAILY
Qty: 20 TABLET | Refills: 0 | Status: SHIPPED | OUTPATIENT
Start: 2020-04-30 | End: 2020-05-10

## 2020-04-30 ASSESSMENT — ENCOUNTER SYMPTOMS
SINUS PAIN: 1
ABDOMINAL PAIN: 0
COUGH: 0
SORE THROAT: 0
RHINORRHEA: 0
TROUBLE SWALLOWING: 0
SINUS PRESSURE: 1
CHEST TIGHTNESS: 0
SHORTNESS OF BREATH: 0

## 2020-04-30 NOTE — PROGRESS NOTES
2020    TELEHEALTH EVALUATION -- Audio/Visual (During YNWDQ-07 public health emergency)    HPI:    Clara Mason (:  1965) has requested an audio/video evaluation for the following concern(s):    Pt. Calling in today for sinus infection symptoms. He started with with sinus pressure, congestion and pounding headache 2 days ago. He slept for 14 hours yesterday with no relief and worsening today. Has been using flonase, saline rinses, and tylenol also. Feels miserable and would like an antibiotic if able. Review of Systems   Constitutional: Positive for activity change and fatigue. Negative for appetite change, chills, diaphoresis, fever and unexpected weight change. HENT: Positive for congestion, postnasal drip, sinus pressure and sinus pain. Negative for ear discharge, ear pain, hearing loss, rhinorrhea, sneezing, sore throat and trouble swallowing. Respiratory: Negative for cough, chest tightness and shortness of breath. Cardiovascular: Negative for chest pain and palpitations. Gastrointestinal: Negative for abdominal pain. Neurological: Positive for headaches. Negative for dizziness. Prior to Visit Medications    Medication Sig Taking? Authorizing Provider   amoxicillin (AMOXIL) 875 MG tablet Take 1 tablet by mouth 2 times daily for 10 days Yes VIJAYA Perez CNP   fluticasone (FLONASE) 50 MCG/ACT nasal spray 1 spray by Nasal route daily Yes VIJAYA Perez CNP   silodosin (RAPAFLO) 4 MG CAPS capsule Take 1 capsule by mouth every evening  VIJAYA Perez CNP       Social History     Tobacco Use    Smoking status: Never Smoker    Smokeless tobacco: Never Used   Substance Use Topics    Alcohol use:  Yes    Drug use: Not on file            PHYSICAL EXAMINATION:  [ INSTRUCTIONS:  \"[x]\" Indicates a positive item  \"[]\" Indicates a negative item  -- DELETE ALL ITEMS NOT EXAMINED]  Vital Signs: (As obtained by patient/caregiver or practitioner observation)    See vitals listed    Constitutional: [x] Appears well-developed and well-nourished [x] No apparent distress      [] Abnormal-   Mental status  [x] Alert and awake  [x] Oriented to person/place/time [x]Able to follow commands      Eyes:  EOM    [x]  Normal  [] Abnormal-  Sclera  [x]  Normal  [] Abnormal -         Discharge [x]  None visible  [] Abnormal -    HENT:   [x] Normocephalic, atraumatic. [] Abnormal   [x] Mouth/Throat: Mucous membranes are moist.     External Ears [x] Normal  [] Abnormal-     Neck: [x] No visualized mass     Pulmonary/Chest: [x] Respiratory effort normal.  [x] No visualized signs of difficulty breathing or respiratory distress        [] Abnormal-      Musculoskeletal:   [] Normal gait with no signs of ataxia         [x] Normal range of motion of neck        [] Abnormal-       Neurological:        [x] No Facial Asymmetry (Cranial nerve 7 motor function) (limited exam to video visit)          [] No gaze palsy        [] Abnormal-         Skin:        [] No significant exanthematous lesions or discoloration noted on facial skin         [] Abnormal-            Psychiatric:       [x] Normal Affect [x] No Hallucinations        [] Abnormal-     Other pertinent observable physical exam findings-     ASSESSMENT/PLAN:  1. Sinus headache  Start abx for worsening symptoms and failed otc meds  Continue saline rinses and flonase  Ok to add claritin if needed for allergies  Call INB or worsening will then consider steroid     - amoxicillin (AMOXIL) 875 MG tablet; Take 1 tablet by mouth 2 times daily for 10 days  Dispense: 20 tablet; Refill: 0    2. Acute maxilary sinusitis, recurrence not specified    - amoxicillin (AMOXIL) 875 MG tablet; Take 1 tablet by mouth 2 times daily for 10 days  Dispense: 20 tablet; Refill: 0      Return if symptoms worsen or fail to improve. Ritika Gallegos is a 62 y.o. male being evaluated by a Virtual Visit (video visit) encounter to address concerns as mentioned above.   GAGE

## 2020-10-22 ENCOUNTER — OFFICE VISIT (OUTPATIENT)
Dept: FAMILY MEDICINE CLINIC | Age: 55
End: 2020-10-22
Payer: COMMERCIAL

## 2020-10-22 ENCOUNTER — NURSE TRIAGE (OUTPATIENT)
Dept: OTHER | Facility: CLINIC | Age: 55
End: 2020-10-22

## 2020-10-22 VITALS
HEART RATE: 65 BPM | WEIGHT: 210 LBS | RESPIRATION RATE: 16 BRPM | HEIGHT: 72 IN | OXYGEN SATURATION: 97 % | TEMPERATURE: 97.3 F | BODY MASS INDEX: 28.44 KG/M2

## 2020-10-22 PROCEDURE — 99202 OFFICE O/P NEW SF 15 MIN: CPT | Performed by: NURSE PRACTITIONER

## 2020-10-22 RX ORDER — FLUTICASONE PROPIONATE 50 MCG
1 SPRAY, SUSPENSION (ML) NASAL DAILY
Qty: 1 BOTTLE | Refills: 1 | Status: SHIPPED | OUTPATIENT
Start: 2020-10-22 | End: 2021-03-08 | Stop reason: SDUPTHER

## 2020-10-22 RX ORDER — OMEPRAZOLE 20 MG/1
20 CAPSULE, DELAYED RELEASE ORAL
Qty: 30 CAPSULE | Refills: 1 | Status: SHIPPED | OUTPATIENT
Start: 2020-10-22 | End: 2020-12-27

## 2020-10-22 ASSESSMENT — ENCOUNTER SYMPTOMS
EYE PAIN: 0
VOMITING: 0
DIARRHEA: 0
COUGH: 0
SORE THROAT: 0
SHORTNESS OF BREATH: 0
RHINORRHEA: 1
ABDOMINAL PAIN: 0
NAUSEA: 0
BACK PAIN: 0
SINUS PAIN: 0

## 2020-10-22 NOTE — TELEPHONE ENCOUNTER
Reason for Disposition   Earache     Both ears with pain/pressure    Answer Assessment - Initial Assessment Questions  1. LOCATION: \"Where does it hurt? \"       Starts at eyes and goes around to base of skull. Worsens in the afternoons. 2. ONSET: \"When did the sinus pain start? \"  (e.g., hours, days)       3 days ago    3. SEVERITY: \"How bad is the pain? \"   (Scale 1-10; mild, moderate or severe)    - MILD (1-3): doesn't interfere with normal activities     - MODERATE (4-7): interferes with normal activities (e.g., work or school) or awakens from sleep    - SEVERE (8-10): excruciating pain and patient unable to do any normal activities         At its worst, a 7/10, right now a 1-2/10.    4. RECURRENT SYMPTOM: \"Have you ever had sinus problems before? \" If so, ask: \"When was the last time? \" and \"What happened that time? \"       Yes , with a sinus infection    5. NASAL CONGESTION: \"Is the nose blocked? \" If so, ask, \"Can you open it or must you breathe through the mouth? \"      Runs at times    6. NASAL DISCHARGE: \"Do you have discharge from your nose? \" If so ask, \"What color? \"      Yes, greenish color . 7. FEVER: \"Do you have a fever? \" If so, ask: \"What is it, how was it measured, and when did it start? \"       No    8. OTHER SYMPTOMS: \"Do you have any other symptoms? \" (e.g., sore throat, cough, earache, difficulty breathing)      Acid reflux, pain / pressure in ears. No sob    9. PREGNANCY: \"Is there any chance you are pregnant? \" \"When was your last menstrual period? \"      no    Protocols used: SINUS PAIN OR CONGESTION-ADULT-OH    [address] reports symptoms as documented above. Caller informed of disposition. Soft transfer to [facility]) ([facility] employee states she must trx to the office d/t technical difficulty) to schedule appointment as recommended. Care advice as documented. Attention Provider: Thank you for allowing me to participate in the care of your patient.   The patient was connected to triage in response to information provided to the E[facility]. Please do not respond through this encounter as the response is not directed to a shared pool.

## 2020-10-22 NOTE — PROGRESS NOTES
[facility]  [address]  [facility] [address] [address]  Dept: [phone]  Dept Fax: [phone]    Alex Benavides is a 60 y.o. male who presents to the urgent care today for his medicalconditions/complaints as noted below. Alex Benavides is c/o of Sinusitis (headache x 3 days)      HPI:     43-year-old male patient presents with complaints of congestion and headache. Reports he is had symptoms for 3 days. Reports nasal congestion, rhinorrhea, postnasal drainage. Addition reports frontal sinus headache and pressure. Patient denies fevers or chills. Denies chest pain or shortness of breath. Denies cough. Denies vomiting or diarrhea. Denies loss of taste smell. Denies any known sick contacts. Treatments tried include allergy medication orally. Is prescribed Flonase does not take it as prescribed. Patient has concerns over GERD symptoms. Reports that he feels a burning to the epigastric region intermittently over the past several months. Denies any bloody vomit. Denies any blood in stool. Denies abdominal pain or chest pains. Past Medical History:   Diagnosis Date    Bronchial spasms 07/08/2019    MAC to LMA with Colonoscopy    Diverticulosis     Hayfever     Hemorrhoid     Sinus infection     finished steroids 2 weeks ago/ now resolved        Current Outpatient Medications   Medication Sig Dispense Refill    fluticasone (FLONASE) 50 MCG/ACT nasal spray 1 spray by Nasal route daily 1 Bottle 1    omeprazole (PRILOSEC) 20 MG delayed release capsule Take 1 capsule by mouth every morning (before breakfast) 30 capsule 1    silodosin (RAPAFLO) 4 MG CAPS capsule Take 1 capsule by mouth every evening 90 capsule 3     No current facility-administered medications for this visit. No Known Allergies    Subjective:      Review of Systems   Constitutional: Negative for chills and fatigue.    HENT: Positive for congestion and rhinorrhea. Negative for ear pain, sinus pain and sore throat. Eyes: Negative for pain and visual disturbance. Respiratory: Negative for cough and shortness of breath. Cardiovascular: Negative for chest pain and palpitations. Gastrointestinal: Negative for abdominal pain, diarrhea, nausea and vomiting. Genitourinary: Negative for penile pain and testicular pain. Musculoskeletal: Negative for back pain, joint swelling and neck pain. Skin: Negative for rash. Neurological: Positive for headaches. Negative for dizziness and light-headedness. Hematological: Does not bruise/bleed easily. All other systems reviewed and are negative. Objective:     Physical Exam  Vitals signs and nursing note reviewed. Constitutional:       General: He is not in acute distress. Appearance: Normal appearance. He is not toxic-appearing. HENT:      Nose: Congestion present. No rhinorrhea. Mouth/Throat:      Mouth: Mucous membranes are moist.   Cardiovascular:      Rate and Rhythm: Normal rate. Pulmonary:      Effort: Pulmonary effort is normal. No respiratory distress. Breath sounds: Normal breath sounds. Skin:     General: Skin is warm and dry. Neurological:      General: No focal deficit present. Mental Status: He is alert and oriented to person, place, and time. Pulse 65   Temp 97.3 °F (36.3 °C)   Resp 16   Ht 6' (1.829 m)   Wt 210 lb (95.3 kg)   SpO2 97%   BMI 28.48 kg/m²   Lab Review   No visits with results within 2 Month(s) from this visit. Latest known visit with results is:   Orders Only on 11/01/2019   Component Date Value    Cholesterol, Total 10/31/2019 194     HDL 10/31/2019 56     LDL Calculated 10/31/2019 113     Triglycerides 10/31/2019 139     Chol/HDL Ratio 10/31/2019 3.5        Assessment:       Diagnosis Orders   1. Allergic rhinitis, unspecified seasonality, unspecified trigger  fluticasone (FLONASE) 50 MCG/ACT nasal spray   2.  Gastroesophageal reflux disease, unspecified whether esophagitis present  omeprazole (PRILOSEC) 20 MG delayed release capsule       Plan:      Return if symptoms worsen or fail to improve. Orders Placed This Encounter   Medications    fluticasone (FLONASE) 50 MCG/ACT nasal spray     Si spray by Nasal route daily     Dispense:  1 Bottle     Refill:  1    omeprazole (PRILOSEC) 20 MG delayed release capsule     Sig: Take 1 capsule by mouth every morning (before breakfast)     Dispense:  30 capsule     Refill:  1     Insufficient duration to be considered sinusitis will treat with Flonase in addition to patient's oral allergy medication  Symptoms worsen or persist call once adequate duration will treat as bacterial sinusitis if symptoms persist    We will treat for presumptive acid reflux with Prilosec 20 once daily. Return as needed, follow-up with PCP         Patient given educational materials - see patient instructions. Discussed use, benefit, and side effects of prescribed medications. All patientquestions answered. Pt voiced understanding. This note was transcribed using dictation with Dragon services. Efforts were made to correct any errors but some words may be misinterpreted.      Electronically signed by VIJAYA Naik CNP on 10/22/2020at 11:46 AM

## 2020-12-27 RX ORDER — OMEPRAZOLE 20 MG/1
CAPSULE, DELAYED RELEASE ORAL
Qty: 30 CAPSULE | Refills: 0 | Status: SHIPPED | OUTPATIENT
Start: 2020-12-27 | End: 2022-01-12 | Stop reason: SDUPTHER

## 2021-03-08 DIAGNOSIS — J30.9 ALLERGIC RHINITIS, UNSPECIFIED SEASONALITY, UNSPECIFIED TRIGGER: ICD-10-CM

## 2021-03-08 RX ORDER — FLUTICASONE PROPIONATE 50 MCG
1 SPRAY, SUSPENSION (ML) NASAL DAILY
Qty: 3 BOTTLE | Refills: 1 | Status: SHIPPED | OUTPATIENT
Start: 2021-03-08 | End: 2022-04-14

## 2021-03-26 ENCOUNTER — TELEMEDICINE (OUTPATIENT)
Dept: FAMILY MEDICINE CLINIC | Age: 56
End: 2021-03-26
Payer: COMMERCIAL

## 2021-03-26 DIAGNOSIS — B96.89 ACUTE BACTERIAL SINUSITIS: Primary | ICD-10-CM

## 2021-03-26 DIAGNOSIS — J01.90 ACUTE BACTERIAL SINUSITIS: Primary | ICD-10-CM

## 2021-03-26 PROCEDURE — 99442 PR PHYS/QHP TELEPHONE EVALUATION 11-20 MIN: CPT | Performed by: NURSE PRACTITIONER

## 2021-03-26 RX ORDER — AZITHROMYCIN 250 MG/1
250 TABLET, FILM COATED ORAL SEE ADMIN INSTRUCTIONS
Qty: 6 TABLET | Refills: 0 | Status: SHIPPED | OUTPATIENT
Start: 2021-03-26 | End: 2021-03-31

## 2021-03-26 ASSESSMENT — PATIENT HEALTH QUESTIONNAIRE - PHQ9
SUM OF ALL RESPONSES TO PHQ QUESTIONS 1-9: 0
2. FEELING DOWN, DEPRESSED OR HOPELESS: 0
SUM OF ALL RESPONSES TO PHQ QUESTIONS 1-9: 0
1. LITTLE INTEREST OR PLEASURE IN DOING THINGS: 0

## 2021-03-26 NOTE — PROGRESS NOTES
Visit Information    Have you changed or started any medications since your last visit including any over-the-counter medicines, vitamins, or herbal medicines? no   Are you having any side effects from any of your medications? -  no  Have you stopped taking any of your medications? Is so, why? -  no    Have you seen any other physician or provider since your last visit? No  Have you had any other diagnostic tests since your last visit? No  Have you been seen in the emergency room and/or had an admission to a hospital since we last saw you? No  Have you had your routine dental cleaning in the past 6 months? no    Have you activated your [email or web address] account? If not, what are your barriers?  Yes     Patient Care Team:  VIJAYA Diaz CNP as PCP - General (Nurse Practitioner)  VIJAYA Diaz CNP as PCP - [facility] Provider    Medical History Review  Past Medical, Family, and Social History reviewed and  contribute to the patient presenting condition    Health Maintenance   Topic Date Due    Hepatitis C screen  Never done    HIV screen  Never done    COVID-19 Vaccine (1) Never done    Diabetes screen  Never done    Shingles Vaccine (1 of 2) Never done    Flu vaccine (1) 09/01/2020    Lipid screen  10/31/2024    DTaP/Tdap/Td vaccine (2 - Td) 04/16/2028    Colon cancer screen colonoscopy  07/08/2029    Hepatitis A vaccine  Aged Out    Hepatitis B vaccine  Aged Out    Hib vaccine  Aged Out    Meningococcal (ACWY) vaccine  Aged Out    Pneumococcal 0-64 years Vaccine  Aged Out

## 2021-03-26 NOTE — PROGRESS NOTES
Matt Bey is a 60 y.o. male evaluated via telephone on 3/26/2021. Consent:  He and/or health care decision maker is aware that that he may receive a bill for this telephone service, depending on his insurance coverage, and has provided verbal consent to proceed: Yes      Documentation:  I communicated with the patient and/or health care decision maker about :  . Pt. Called in today for discussion about possible sinus infection. He tends to get this yearly around this time d/t weather changes and dryness. He states he has sinus congestion, sinus headaches and yellow sinus drainage. This feels typically of prev. Sinus infections. He has had no exposure to covid 19 that he is aware of and is wearing masks when out at work or in public. Has normal taste, smell, denies diarrhea, chills or body aches, cough or SOB. Has ad no fever. He has been taking otc tylenol or motrin when headache gets uncomfortable. Details of this discussion including any medical advice:  Pt. Advised to stay home this weekend away from others to see if symptoms change or worse with start of abx. If so he needs to get tested for covid -19 as symptoms can vary widely. Will start zpack for now and have him start saline nasal rinses and push fluids, rest. Frequent handwashing  Call INB or worsening. Pt agrees to plan. Diagnosis Orders   1. Acute bacterial sinusitis  azithromycin (ZITHROMAX) 250 MG tablet           I affirm this is a Patient Initiated Episode with a Patient who has not had a related appointment within my department in the past 7 days or scheduled within the next 24 hours. Patient identification was verified at the start of the visit: Yes    Total Time: minutes: 11-20 minutes    The visit was conducted pursuant to the emergency declaration under the [facility], [address] [place] [address] authority and the [employer] and [employer] General Act.   Patient identification was verified, and a caregiver was present when appropriate. The patient was located in a state where the provider was credentialed to provide care.     Note: not billable if this call serves to triage the patient into an appointment for the relevant concern      Cristopher Schlatter

## 2021-10-28 ENCOUNTER — TELEPHONE (OUTPATIENT)
Dept: FAMILY MEDICINE CLINIC | Age: 56
End: 2021-10-28

## 2021-10-28 DIAGNOSIS — Z00.00 ROUTINE GENERAL MEDICAL EXAMINATION AT A HEALTH CARE FACILITY: ICD-10-CM

## 2021-10-28 DIAGNOSIS — Z13.1 SCREENING FOR DIABETES MELLITUS: ICD-10-CM

## 2021-10-28 DIAGNOSIS — Z13.220 SCREENING FOR LIPOID DISORDERS: ICD-10-CM

## 2021-10-28 DIAGNOSIS — Z12.5 SPECIAL SCREENING FOR MALIGNANT NEOPLASM OF PROSTATE: Primary | ICD-10-CM

## 2021-10-28 DIAGNOSIS — Z11.59 NEED FOR HEPATITIS C SCREENING TEST: ICD-10-CM

## 2021-10-28 DIAGNOSIS — Z11.4 SCREENING FOR HIV (HUMAN IMMUNODEFICIENCY VIRUS): ICD-10-CM

## 2021-10-28 NOTE — TELEPHONE ENCOUNTER
Patient is scheduled for a physical on 11/18 and would like his annual bloodwork to be placed so he can get that done prior to his appointment please. Please advise. Thank you.

## 2021-11-05 DIAGNOSIS — Z13.1 SCREENING FOR DIABETES MELLITUS: ICD-10-CM

## 2021-11-05 DIAGNOSIS — Z11.59 NEED FOR HEPATITIS C SCREENING TEST: ICD-10-CM

## 2021-11-05 DIAGNOSIS — Z00.00 ROUTINE GENERAL MEDICAL EXAMINATION AT A HEALTH CARE FACILITY: ICD-10-CM

## 2021-11-05 DIAGNOSIS — Z11.4 SCREENING FOR HIV (HUMAN IMMUNODEFICIENCY VIRUS): ICD-10-CM

## 2021-11-05 DIAGNOSIS — Z13.220 SCREENING FOR LIPOID DISORDERS: ICD-10-CM

## 2021-11-05 LAB
ANTIBODY: NONREACTIVE
AVERAGE GLUCOSE: NORMAL
BASOPHILS ABSOLUTE: 70 /ΜL
BASOPHILS RELATIVE PERCENT: 1.2 %
BUN BLDV-MCNC: 16 MG/DL
CALCIUM SERPL-MCNC: 9.1 MG/DL
CHLORIDE BLD-SCNC: 102 MMOL/L
CHOLESTEROL, TOTAL: 172 MG/DL
CHOLESTEROL/HDL RATIO: 3.2
CO2: 28 MMOL/L
CREAT SERPL-MCNC: 0.8 MG/DL
EOSINOPHILS ABSOLUTE: 499 /ΜL
EOSINOPHILS RELATIVE PERCENT: 8.6 %
GFR CALCULATED: NORMAL
GLUCOSE BLD-MCNC: 81 MG/DL
HBA1C MFR BLD: 5.6 %
HCT VFR BLD CALC: 51.6 % (ref 41–53)
HDLC SERPL-MCNC: 54 MG/DL (ref 35–70)
HEMOGLOBIN: 16.5 G/DL (ref 13.5–17.5)
HIV AG/AB: NONREACTIVE
LDL CHOLESTEROL CALCULATED: 95 MG/DL (ref 0–160)
LYMPHOCYTES ABSOLUTE: 1322 /ΜL
LYMPHOCYTES RELATIVE PERCENT: 22.8 %
MCH RBC QN AUTO: 26.4 PG
MCHC RBC AUTO-ENTMCNC: 32 G/DL
MCV RBC AUTO: 82.7 FL
MONOCYTES ABSOLUTE: 493 /ΜL
MONOCYTES RELATIVE PERCENT: 8.5 %
NEUTROPHILS ABSOLUTE: 3416 /ΜL
NEUTROPHILS RELATIVE PERCENT: 58.9 %
NONHDLC SERPL-MCNC: 118 MG/DL
PDW BLD-RTO: 13.1 %
PLATELET # BLD: 284 K/ΜL
PMV BLD AUTO: 10.1 FL
POTASSIUM SERPL-SCNC: 4.5 MMOL/L
PROSTATE SPECIFIC ANTIGEN: 3.11 NG/ML
RBC # BLD: 6.24 10^6/ΜL
SODIUM BLD-SCNC: 139 MMOL/L
TRIGL SERPL-MCNC: 132 MG/DL
VLDLC SERPL CALC-MCNC: NORMAL MG/DL
WBC # BLD: 5.8 10^3/ML

## 2021-11-08 DIAGNOSIS — Z12.5 SPECIAL SCREENING FOR MALIGNANT NEOPLASM OF PROSTATE: ICD-10-CM

## 2021-11-18 ENCOUNTER — OFFICE VISIT (OUTPATIENT)
Dept: FAMILY MEDICINE CLINIC | Age: 56
End: 2021-11-18
Payer: COMMERCIAL

## 2021-11-18 VITALS
TEMPERATURE: 98.4 F | HEIGHT: 72 IN | BODY MASS INDEX: 29.53 KG/M2 | HEART RATE: 89 BPM | WEIGHT: 218 LBS | DIASTOLIC BLOOD PRESSURE: 88 MMHG | OXYGEN SATURATION: 98 % | SYSTOLIC BLOOD PRESSURE: 134 MMHG

## 2021-11-18 DIAGNOSIS — Z23 FLU VACCINE NEED: ICD-10-CM

## 2021-11-18 DIAGNOSIS — H90.3 SENSORINEURAL HEARING LOSS (SNHL) OF BOTH EARS: ICD-10-CM

## 2021-11-18 DIAGNOSIS — N40.0 BENIGN PROSTATIC HYPERPLASIA WITHOUT LOWER URINARY TRACT SYMPTOMS: ICD-10-CM

## 2021-11-18 DIAGNOSIS — Z00.00 ENCOUNTER FOR WELL ADULT EXAM WITHOUT ABNORMAL FINDINGS: Primary | ICD-10-CM

## 2021-11-18 DIAGNOSIS — M25.511 ACUTE PAIN OF RIGHT SHOULDER: ICD-10-CM

## 2021-11-18 PROCEDURE — 90471 IMMUNIZATION ADMIN: CPT | Performed by: NURSE PRACTITIONER

## 2021-11-18 PROCEDURE — 90674 CCIIV4 VAC NO PRSV 0.5 ML IM: CPT | Performed by: NURSE PRACTITIONER

## 2021-11-18 PROCEDURE — 99396 PREV VISIT EST AGE 40-64: CPT | Performed by: NURSE PRACTITIONER

## 2021-11-18 RX ORDER — SILODOSIN 4 MG/1
4 CAPSULE ORAL EVERY EVENING
Qty: 90 CAPSULE | Refills: 3 | Status: SHIPPED | OUTPATIENT
Start: 2021-11-18 | End: 2021-11-18 | Stop reason: SDUPTHER

## 2021-11-18 RX ORDER — SILODOSIN 4 MG/1
4 CAPSULE ORAL EVERY EVENING
Qty: 30 CAPSULE | Refills: 0 | Status: SHIPPED | OUTPATIENT
Start: 2021-11-18 | End: 2021-12-15

## 2021-11-18 ASSESSMENT — ENCOUNTER SYMPTOMS
RHINORRHEA: 0
COUGH: 0
CHEST TIGHTNESS: 0
CONSTIPATION: 0
DIARRHEA: 0
ABDOMINAL PAIN: 0
NAUSEA: 0
EYE PAIN: 0
SORE THROAT: 0
SHORTNESS OF BREATH: 0
RECTAL PAIN: 0
COLOR CHANGE: 0

## 2021-11-18 NOTE — PATIENT INSTRUCTIONS
Learning About Healthy Weight  What is a healthy weight? A healthy weight is the weight at which you feel good about yourself and have energy for work and play. It's also one that lowers your risk for health problems. What can you do to stay at a healthy weight? It can be hard to stay at a healthy weight, especially when fast food, vending-machine snacks, and processed foods are so easy to find. And with your busy lifestyle, activity may be low on your list of things to do. But staying at a healthy weight may be easier than you think. Here are some dos and don'ts for staying at a healthy weight. Do eat healthy foods  The kinds of foods you eat have a big impact on both your weight and your health. Reaching and staying at a healthy weight is not about going on a diet. It's about making healthier food choices every day and changing your diet for good. Healthy eating means eating a variety of foods so that you get all the nutrients you need. Your body needs protein, carbohydrate, and fats for energy. They keep your heart beating, your brain active, and your muscles working. On most days, try to eat from each food group. This means eating a variety of:  · Whole grains, such as whole wheat breads and pastas. · Fruits and vegetables. · Dairy products, such as low-fat milk, yogurt, and cheese. · Lean proteins, such as all types of fish, chicken without the skin, and beans. Don't have too much or too little of one thing. All foods, if eaten in moderation, can be part of healthy eating. Even sweets can be okay. If your favorite foods are high in fat, salt, sugar, or calories, limit how often you eat them. Eat smaller servings, or look for healthy substitutes. Do watch what you eat  Many people eat more than their bodies need. Part of staying at a healthy weight means learning how much food you really need from day to day and not eating more than that.  Even with healthy foods, eating too much can make you gain weight. Having a well-balanced diet means that you eat enough, but not too much, and that your food gives you the nutrients you need to stay healthy. So listen to your body. Eat when you're hungry. Stop when you feel satisfied. It's a good idea to have healthy snacks ready for when you get hungry. Keep healthy snacks with you at work, in your car, and at home. If you have a healthy snack easily available, you'll be less likely to pick a candy bar or bag of chips from a vending machine instead. Some healthy snacks you might want to keep on hand are fruit, low-fat yogurt, string cheese, low-fat microwave popcorn, raisins and other dried fruit, nuts, whole wheat crackers, pretzels, carrots, celery sticks, and broccoli. Do some physical activity  A big part of reaching and staying at a healthy weight is being active. When you're active, you burn calories. This makes it easier to reach and stay at a healthy weight. When you're active on a regular basis, your body burns more calories, even when you're at rest. Being active helps you lose fat and build lean muscle. Try to be active for at least 1 hour every day. This may sound like a lot, but it's okay to be active in smaller blocks of time that add up to 1 hour a day. Any activity that makes your heart beat faster and keeps it there for a while counts. A brisk walk, run, or swim will get your heart beating faster. So will climbing stairs, shooting baskets, or cycling. Even some household chores like vacuuming and mowing the lawn will get your heart rate up. Pick activities that you enjoyones that make your heart beat faster, your muscles stronger, and your muscles and joints more flexible. If you find more than one thing you like doing, do them all. You don't have to do the same thing every day. Don't diet  Diets don't work. Diets are temporary. Because you give up so much when you diet, you may be hungry and think about food all the time.  And after you stop dieting, you also may overeat to make up for what you missed. Most people who diet end up gaining back the pounds they lostand more. Remember that healthy bodies come in lots of shapes and sizes. Everyone can get healthier by eating better and being more active. Where can you learn more? Go to https://chpepicewkoby.healthTwistle. org and sign in to your [employer] account. Enter 764 [id number] in the [employer] box to learn more about \"Learning About Healthy Weight. \"     If you do not have an account, please click on the \"Sign Up Now\" link. Current as of: March 17, 2021               Content Version: 13.0  © 2006-2021 Healthwise, [employer]. Care instructions adapted under license by [facility] [employer] [facility] ([facility]). If you have questions about a medical condition or this instruction, always ask your healthcare professional. [place] [address] any warranty or liability for your use of this information. Well Visit, Men 48 to 72: Care Instructions  Overview     Well visits can help you stay healthy. Your doctor has checked your overall health and may have suggested ways to take good care of yourself. Your doctor also may have recommended tests. At home, you can help prevent illness with healthy eating, regular exercise, and other steps. Follow-up care is a key part of your treatment and safety. Be sure to make and go to all appointments, and call your doctor if you are having problems. It's also a good idea to know your test results and keep a list of the medicines you take. How can you care for yourself at home? · Get screening tests that you and your doctor decide on. Screening helps find diseases before any symptoms appear. · Eat healthy foods. Choose fruits, vegetables, whole grains, protein, and low-fat dairy foods. Limit fat, especially saturated fat. Reduce salt in your diet. · Limit alcohol. Have no more than 2 drinks a day or 14 drinks a week.   · Get at least 30 minutes of exercise on most days of the week. Walking is a good choice. You also may want to do other activities, such as running, swimming, cycling, or playing tennis or team sports. · Reach and stay at a healthy weight. This will lower your risk for many problems, such as obesity, diabetes, heart disease, and high blood pressure. · Do not smoke. Smoking can make health problems worse. If you need help quitting, talk to your doctor about stop-smoking programs and medicines. These can increase your chances of quitting for good. · Care for your mental health. It is easy to get weighed down by worry and stress. Learn strategies to manage stress, like deep breathing and mindfulness, and stay connected with your family and community. If you find you often feel sad or hopeless, talk with your doctor. Treatment can help. · Talk to your doctor about whether you have any risk factors for sexually transmitted infections (STIs). You can help prevent STIs if you wait to have sex with a new partner (or partners) until you've each been tested for STIs. It also helps if you use condoms (male or female condoms) and if you limit your sex partners to one person who only has sex with you. Vaccines are available for some STIs. · If it's important to you to prevent pregnancy with your partner, talk with your doctor about birth control options that might be best for you. · If you think you may have a problem with alcohol or drug use, talk to your doctor. This includes prescription medicines (such as amphetamines and opioids) and illegal drugs (such as cocaine and methamphetamine). Your doctor can help you figure out what type of treatment is best for you. · Protect your skin from too much sun. When you're outdoors from 10 a.m. to 4 p.m., stay in the shade or cover up with clothing and a hat with a wide brim. Wear sunglasses that block UV rays. Even when it's cloudy, put broad-spectrum sunscreen (SPF 30 or higher) on any exposed skin.   · See a dentist one or two times a year for checkups and to have your teeth cleaned. · Wear a seat belt in the car. When should you call for help? Watch closely for changes in your health, and be sure to contact your doctor if you have any problems or symptoms that concern you. Where can you learn more? Go to https://chpepicewkoby.healthKanjoya. org and sign in to your [employer] account. Enter I416 in the [employer] box to learn more about \"Well Visit, Men 48 to 72: Care Instructions. \"     If you do not have an account, please click on the \"Sign Up Now\" link. Current as of: February 11, 2021               Content Version: 13.0  © 2006-2021 Healthwise, [employer]. Care instructions adapted under license by [facility] ([facility]). If you have questions about a medical condition or this instruction, always ask your healthcare professional. [place] [address] any warranty or liability for your use of this information. A Healthy Lifestyle: Care Instructions  Your Care Instructions     A healthy lifestyle can help you feel good, stay at a healthy weight, and have plenty of energy for both work and play. A healthy lifestyle is something you can share with your whole family. A healthy lifestyle also can lower your risk for serious health problems, such as high blood pressure, heart disease, and diabetes. You can follow a few steps listed below to improve your health and the health of your family. Follow-up care is a key part of your treatment and safety. Be sure to make and go to all appointments, and call your doctor if you are having problems. It's also a good idea to know your test results and keep a list of the medicines you take. How can you care for yourself at home? · Do not eat too much sugar, fat, or fast foods. You can still have dessert and treats now and then. The goal is moderation. · Start small to improve your eating habits.  Pay attention to portion sizes, drink less juice and soda pop, and eat more fruits and vegetables. ? Eat a healthy amount of food. A 3-ounce serving of meat, for example, is about the size of a deck of cards. Fill the rest of your plate with vegetables and whole grains. ? Limit the amount of soda and sports drinks you have every day. Drink more water when you are thirsty. ? Eat plenty of fruits and vegetables every day. Have an apple or some carrot sticks as an afternoon snack instead of a candy bar. Try to have fruits and/or vegetables at every meal.  · Make exercise part of your daily routine. You may want to start with simple activities, such as walking, bicycling, or slow swimming. Try to be active 30 to 60 minutes every day. You do not need to do all 30 to 60 minutes all at once. For example, you can exercise 3 times a day for 10 or 20 minutes. Moderate exercise is safe for most people, but it is always a good idea to talk to your doctor before starting an exercise program.  · Keep moving. Kellen Enter the lawn, work in the garden, or [employer]. Take the stairs instead of the elevator at work. · If you smoke, quit. People who smoke have an increased risk for heart attack, stroke, cancer, and other lung illnesses. Quitting is hard, but there are ways to boost your chance of quitting tobacco for good. ? Use nicotine gum, patches, or lozenges. ? Ask your doctor about stop-smoking programs and medicines. ? Keep trying. In addition to reducing your risk of diseases in the future, you will notice some benefits soon after you stop using tobacco. If you have shortness of breath or asthma symptoms, they will likely get better within a few weeks after you quit. · Limit how much alcohol you drink. Moderate amounts of alcohol (up to 2 drinks a day for men, 1 drink a day for women) are okay. But drinking too much can lead to liver problems, high blood pressure, and other health problems.   Family health  If you have a family, there are many things you can do

## 2021-11-18 NOTE — PROGRESS NOTES
Well Adult Note  Name: Abbie James Date: 2021   MRN: [id number] Sex: Male   Age: 60 y.o. Ethnicity: Non- / Non    : 1965 Race: White (non-)      Gifty Pearson is here for well adult exam.  History:  Doing well. 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 Needs meds refills and tolerating all well. Wonders about cerumen impaction, has had mild hearing loss bilaterally. Also has mild movement pain in right shoulder. States it started this past summer after starting to golf. Has been slightly better since golf season ended but still present with certain positions. Review of Systems   Constitutional: Negative for activity change, chills, fatigue, fever and unexpected weight change. HENT: Positive for hearing loss. Negative for congestion, ear pain, postnasal drip, rhinorrhea and sore throat. Eyes: Negative for pain and visual disturbance. Respiratory: Negative for cough, chest tightness and shortness of breath. Cardiovascular: Negative for chest pain, palpitations and leg swelling. Gastrointestinal: Negative for abdominal pain, constipation, diarrhea, nausea and rectal pain. Endocrine: Negative for polydipsia, polyphagia and polyuria. Genitourinary: Negative for difficulty urinating, dysuria, hematuria, penile pain, penile swelling, scrotal swelling and testicular pain. Musculoskeletal: Positive for myalgias (right shoulder). Skin: Negative for color change. Allergic/Immunologic: Negative for immunocompromised state. Neurological: Negative for dizziness, weakness, numbness and headaches. Psychiatric/Behavioral: Negative for dysphoric mood. The patient is not nervous/anxious. No Known Allergies      Prior to Visit Medications    Medication Sig Taking?  Authorizing Provider   silodosin (RAPAFLO) 4 MG CAPS capsule Take 1 capsule by mouth every evening Yes VIJAYA Tafoya - LUCRECIA   omeprazole (PRILOSEC) 20 MG delayed release capsule TAKE ONE CAPSULE BY MOUTH EVERY MORNING BEOFRE BREAKFAST Yes VIJAYA Hill CNP   fluticasone (FLONASE) 50 MCG/ACT nasal spray 1 spray by Nasal route daily  Patient not taking: Reported on 11/18/2021  VIJAYA Hill CNP         Past Medical History:   Diagnosis Date    Bronchial spasms 07/08/2019    MAC to LMA with Colonoscopy    Diverticulosis     Hayfever     Hemorrhoid     Sinus infection     finished steroids 2 weeks ago/ now resolved       Past Surgical History:   Procedure Laterality Date    COLONOSCOPY  07/08/2019    Diagnosis: Same; severe diverticulosis at sigmoid colon; external hemorrhoids     COLONOSCOPY N/A 7/8/2019    SCREENING COLONOSCOPY performed by Bridgett Elias DO at [address] History   Problem Relation Age of Onset    No Known Problems Mother     No Known Problems Father     Cancer Maternal Grandmother         colon       Social History     Tobacco Use    Smoking status: Never Smoker    Smokeless tobacco: Never Used   Substance Use Topics    Alcohol use: Yes    Drug use: Not on file       Objective   /88 (Site: Right Upper Arm, Position: Sitting, Cuff Size: Medium Adult)   Pulse 89   Temp 98.4 °F (36.9 °C) (Temporal)   Ht 6' (1.829 m)   Wt 218 lb (98.9 kg)   SpO2 98%   BMI 29.57 kg/m²   Wt Readings from Last 3 Encounters:   11/18/21 218 lb (98.9 kg)   10/22/20 210 lb (95.3 kg)   04/30/20 211 lb (95.7 kg)     There were no vitals filed for this visit. Physical Exam  Vitals and nursing note reviewed. Constitutional:       General: He is not in acute distress. Appearance: Normal appearance. He is well-developed. He is not ill-appearing or diaphoretic.       Comments: /88 (Site: Right Upper Arm, Position: Sitting, Cuff Size: Medium Adult)   Pulse 89   Temp 98.4 °F (36.9 °C) (Temporal)   Ht 6' (1.829 m)   Wt 218 lb (98.9 kg)   SpO2 98%   BMI 29.57 kg/m²      HENT:      Head: Normocephalic and atraumatic. Right Ear: Hearing, tympanic membrane, ear canal and external ear normal. There is no impacted cerumen. Left Ear: Hearing, tympanic membrane, ear canal and external ear normal. There is no impacted cerumen. Nose: Nose normal.      Mouth/Throat:      Pharynx: No oropharyngeal exudate. Eyes:      Conjunctiva/sclera: Conjunctivae normal.      Pupils: Pupils are equal, round, and reactive to light. Neck:      Thyroid: No thyromegaly. Vascular: No JVD. Cardiovascular:      Rate and Rhythm: Normal rate and regular rhythm. Heart sounds: Normal heart sounds. No murmur heard. Comments: NO LE edema  Pulmonary:      Effort: Pulmonary effort is normal. No respiratory distress. Breath sounds: Normal breath sounds. No wheezing. Abdominal:      General: Bowel sounds are normal.      Palpations: Abdomen is soft. Tenderness: There is no abdominal tenderness. Musculoskeletal:         General: Tenderness (along trap of right shoulder) present. Normal range of motion. Cervical back: Normal range of motion and neck supple. Lymphadenopathy:      Cervical: No cervical adenopathy. Skin:     General: Skin is warm and dry. Capillary Refill: Capillary refill takes less than 2 seconds. Findings: No rash. Neurological:      General: No focal deficit present. Mental Status: He is alert. Mental status is at baseline. Motor: No abnormal muscle tone. Coordination: Coordination normal.   Psychiatric:         Mood and Affect: Mood normal.         Behavior: Behavior normal.         Thought Content: Thought content normal.         Judgment: Judgment normal.           Assessment   Plan   1.  Encounter for well adult exam without abnormal findings  Doing well overall  Patient advised to follow heart healthy, low fat  diet and 150 minutes of cardiovascular exercise per week   The nature of sun-induced photo-aging and skin cancers is discussed. Sun avoidance, protective clothing, and the use of 30-SPF sunscreens is advised. Observe closely for skin damage/changes, and call if such occurs. Dentist q6months  Labs reviewed and stable    2. Acute pain of right shoulder  Referral given for sports med   -     TristinstWagner may DO, Sports Medicine and Primary Care  [place]  3. Benign prostatic hyperplasia without lower urinary tract symptoms  Refill given, working well for him    -     silodosin (RAPAFLO) 4 MG CAPS capsule; Take 1 capsule by mouth every evening, Disp-30 capsule, R-0Normal  4. Sensorineural hearing loss (SNHL) of both ears  Declines audiology referral  No wax present    5.  Flu vaccine need  Given today    -     INFLUENZA, MDCK QUADV, 2 YRS AND OLDER, IM, PF, PREFILL SYR OR SDV, 0.5ML (FLUCELVAX QUADV, PF)         Personalized Preventive Plan   Current Health Maintenance Status  Immunization History   Administered Date(s) Administered    Influenza, MDCK Quadv, IM, PF (Flucelvax 2 yrs and older) 11/18/2021    Influenza, Quadv, IM, (6 mo and older Fluzone, Flulaval, Fluarix and 3 yrs and older Afluria) 10/29/2018    Influenza, Quadv, IM, PF (6 mo and older Fluzone, Flulaval, Fluarix, and 3 yrs and older Afluria) 11/01/2019    Influenza, Quadv, Recombinant, IM PF (Flublok 18 yrs and older) 10/15/2020    Tdap (Boostrix, Adacel) 04/16/2018    Zoster Recombinant (Shingrix) 10/15/2020        Health Maintenance   Topic Date Due    COVID-19 Vaccine (1) Never done    Shingles Vaccine (2 of 2) 12/10/2020    Lipid screen  11/03/2026    DTaP/Tdap/Td vaccine (2 - Td or Tdap) 04/16/2028    Colon cancer screen colonoscopy  07/08/2029    Flu vaccine  Completed    Hepatitis C screen  Completed    HIV screen  Completed    Hepatitis A vaccine  Aged Out    Hepatitis B vaccine  Aged Out    Hib vaccine  Aged Out    Meningococcal (ACWY) vaccine  Aged Out    Pneumococcal 0-64 years Vaccine  Aged Out     Recommendations for Preventive Services Due: see orders and patient instructions/AVS.    No follow-ups on file.

## 2021-11-18 NOTE — PROGRESS NOTES
Visit Information    Have you changed or started any medications since your last visit including any over-the-counter medicines, vitamins, or herbal medicines? no   Have you stopped taking any of your medications? Is so, why? -  no  Are you having any side effects from any of your medications? - no    Have you seen any other physician or provider since your last visit?  no   Have you had any other diagnostic tests since your last visit?  no   Have you been seen in the emergency room and/or had an admission in a hospital since we last saw you?  no   Have you had your routine dental cleaning in the past 6 months?  no     Do you have an active MyChart account? If no, what is the barrier? Yes    Patient Care Team:  VIJAYA Araya CNP as PCP - General (Nurse Practitioner)  VIJAYA Araya CNP as PCP - [facility] Provider    Medical History Review  Past Medical, Family, and Social History reviewed and  contribute to the patient presenting condition    Health Maintenance   Topic Date Due    COVID-19 Vaccine (1) Never done    Shingles Vaccine (2 of 2) 12/10/2020    Flu vaccine (1) 09/01/2021    Lipid screen  11/03/2026    DTaP/Tdap/Td vaccine (2 - Td or Tdap) 04/16/2028    Colon cancer screen colonoscopy  07/08/2029    Hepatitis C screen  Completed    HIV screen  Completed    Hepatitis A vaccine  Aged Out    Hepatitis B vaccine  Aged Out    Hib vaccine  Aged Out    Meningococcal (ACWY) vaccine  Aged Out    Pneumococcal 0-64 years Vaccine  Aged Out     After obtaining consent, and per orders of Bill Bautista CNP, injection of flu given in Left deltoid by Alfredo Ling MA. Patient instructed to remain in clinic for 20 minutes afterwards, and to report any adverse reaction to me immediately. Vaccine Information Sheet, \"Influenza - Inactivated\"  given to Sang Em, or parent/legal guardian of  Sang Em and verbalized understanding.     Patient responses:    Have you ever had a reaction to a flu vaccine? No  Are you able to eat eggs without adverse effects? Yes  Do you have any current illness? No  Have you ever had Guillian Montgomery Syndrome? No    Flu vaccine given per order. Please see immunization tab.

## 2021-11-22 DIAGNOSIS — M25.511 RIGHT SHOULDER PAIN, UNSPECIFIED CHRONICITY: Primary | ICD-10-CM

## 2021-11-23 ENCOUNTER — OFFICE VISIT (OUTPATIENT)
Dept: ORTHOPEDIC SURGERY | Age: 56
End: 2021-11-23
Payer: COMMERCIAL

## 2021-11-23 VITALS
SYSTOLIC BLOOD PRESSURE: 150 MMHG | DIASTOLIC BLOOD PRESSURE: 102 MMHG | WEIGHT: 218 LBS | BODY MASS INDEX: 29.53 KG/M2 | HEIGHT: 72 IN

## 2021-11-23 DIAGNOSIS — M75.81 RIGHT ROTATOR CUFF TENDINITIS: Primary | ICD-10-CM

## 2021-11-23 PROCEDURE — 99203 OFFICE O/P NEW LOW 30 MIN: CPT | Performed by: FAMILY MEDICINE

## 2021-11-23 NOTE — PROGRESS NOTES
Sports Medicine Consultation    CHIEF COMPLAINT:  Shoulder Pain (Rt. 6m. progressive pain with golfing. anterior/superior pain. )        HPI:   The patient is a 60 y.o. male who is being seen as a new patient being seen for regarding new problem right shoulder. The patient is a bilateral hand dominant male who has had shoulder pain for months. As far as trauma to the shoulder, the patient indicates pain with gold. The pain is  worse at night and when doing overhead activities. Weakness of the shoulder has not  been noted. The pain restricts activities such as none just nagging especially with sleeping. The pain does not seem to improve with time. The following medications have been tried: nothing without benefit. Physical Therapy has not been tried. Corticosteroid injection has not been done. Neck pain has not been present. he has a past medical history of Bronchial spasms, Diverticulosis, Hayfever, Hemorrhoid, and Sinus infection. he has a past surgical history that includes Colonoscopy (07/08/2019) and Colonoscopy (N/A, 7/8/2019). Past Medical History:   Diagnosis Date    Bronchial spasms 07/08/2019    MAC to LMA with Colonoscopy    Diverticulosis     Hayfever     Hemorrhoid     Sinus infection     finished steroids 2 weeks ago/ now resolved       Past Surgical History:   Procedure Laterality Date    COLONOSCOPY  07/08/2019    Diagnosis: Same; severe diverticulosis at sigmoid colon; external hemorrhoids     COLONOSCOPY N/A 7/8/2019    SCREENING COLONOSCOPY performed by Erna Avery DO at [place] [address]       family history includes Cancer in his maternal grandmother; No Known Problems in his father and mother.     Social History     Socioeconomic History    Marital status:      Spouse name: Not on file    Number of children: Not on file    Years of education: Not on file    Highest education level: Not on file   Occupational History    Not on file   Tobacco Use    No current facility-administered medications for this visit. Allergies:  hehas No Known Allergies. ROS:  CV:  Denies chest pain; palpitations; shortness of breath; swelling of feet, ankles; and loss of consciousness. CON: Denies fever and dizziness. ENT:  Denies hearing loss / ringing, ear infections hoarseness, and swallowing problems. RESP:  Denies chronic cough, spitting up blood, and asthma/wheezing. GI: Denies abdominal pain, change in bowel habits, nausea or vomiting, and blood in stools. :  Denies frequent urination, burning or painful urination, blood in the urine, and bladder incontinence. NEURO:  Denies headache, memory loss, sleep disturbance, and tremor or movement disorder. PHYSICAL EXAM:   BP (!) 150/102   Ht 6' (1.829 m)   Wt 218 lb (98.9 kg)   BMI 29.57 kg/m²   GENERAL: Flossie Collet is a 60 y.o. male who is alert and oriented and sitting comfortably in our office. SKIN:  Intact without rashes, lesions or ulcerations. No obvious deformity or swelling. NEURO: Musculoskeletal and axillary nerves intact to sensory and motor testing. EYES:  Extraocular muscles intact. MOUTH: Oral mucosa moist.  No perioral lesions. PULM:  Respirations unlabored and regular. VASC:  Capillary refill less than 3 seconds. Cervical spine ROM WNL  Spurlings: negative,     MSK:  Forward elevation 180 degrees, external rotation in neutral 90 degrees, abduction 180 degrees, internal rotation to T8. Supraspinatus 5/5   External rotators 5/5  Internal 5/5  Full Can negative   Empty Can negative   Neer's test positive   Prescott-Maurilio test. positive. Pain with cross body adduction negative. Anterior Labral Stress test negative. Speed's test negative   Tama's test negative. Pain over anterolateral acromion negative. Subscap liftoff negative. Belly press test negative. Pain over AC joint negative. Pain over traps/rhomboids negative.      PSYCH:  Patient has good fund of knowledge and displays understanding of exam.    RADIOLOGY: No results found. 3 views of the Right shoulder were ordered, independently visualized by me, and discussed with patient. Findings: Right shoulder radiographs demonstrate pretty significant degenerative changes at the acromioclavicular joint with evidence of supraspinatus footprint tendinitis no obvious fractures or dislocations are noted on the right shoulder    Impression: Evidence of degenerative changes of the rotator cuff at the supraspinatus footprint and osteoarthritis of the acromioclavicular joint    IMPRESSION:     1. Right rotator cuff tendinitis        PLAN:   We discussed some of the etiologies and natural histories of     ICD-10-CM    1. Right rotator cuff tendinitis  M75.81 Ambulatory referral to Physical Therapy     We discussed the various treatment alternatives including anti-inflammatory medications, physical therapy, injections, further imaging studies and as a last resort surgery. This point patient has's minimal irritation to his rotator cuff and is best served with course of formal physical therapy we will set him up with a course of physical therapy and have him follow-up with us based on his progression patient voiced understanding agreement this plan    Return to clinic No follow-ups on file. Goldie De Paz     Please be aware portions of this note were completed using voice recognition software and unforeseen errors may have occurred    Electronically signed by Andrei Holland DO, FAOASM on 11/23/21 at 11:20 AM EST

## 2021-12-01 ENCOUNTER — HOSPITAL ENCOUNTER (OUTPATIENT)
Dept: PHYSICAL THERAPY | Facility: CLINIC | Age: 56
Setting detail: THERAPIES SERIES
Discharge: HOME | End: 2021-12-01
Payer: COMMERCIAL

## 2021-12-01 PROCEDURE — 97161 PT EVAL LOW COMPLEX 20 MIN: CPT

## 2021-12-01 PROCEDURE — 97110 THERAPEUTIC EXERCISES: CPT

## 2021-12-01 NOTE — CONSULTS
[] [facility] [facility]  [address]  [phone]  F: [phone] [] [address]  [place] [address]   [address]  P: [phone]  F: [phone] [] [place]  [address]  P: [phone]  F: [phone] [] [address]  P: [phone]  F: [phone] [] [address]  [facility]   [address]   [place]: [phone]  F: [phone]      Physical Therapy Upper Extremity Evaluation    Date:  2021  Patient: Jose Sanchez  : 1965  MRN: [id number]  Physician: Omayra Frikeenan D.OBia Insurance: [facility]  Medical Diagnosis: Right rotator cuff tendinitis (M75.81 [ICD-10-CM])  Rehab Codes: M25.511, M25.611, M79.621  Onset Date: 2021   Next 's appt: tbd    Subjective:   CC/HPI: (onset date): Pt is a 60 y.o. male who reports onset of Right shoulder pain in 2021. He notes he was golfing in  and felt something in his Right shoulder \"ping\". He continued to play throughout the Summer, noting the pain would not get higher than a 3/10. Since he has stopped playing the pain has become less. 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 His cc is intermittent Right shoulder pain, dull in nature. Current pain rating 2/10.      PMHx: [] Unremarkable [] Diabetes [] HTN  [] Pacemaker   [] MI/Heart Problems [] Cancer [] Arthritis [] Other:              [x] Refer to full medical chart  In EPIC     Comorbidities:   [] Obesity [] Dialysis  [] N/A   [] Asthma/COPD [] Dementia [] Other:   [] Stroke [] Sleep apnea [] Other:   [] Vascular disease [] Rheumatic disease [] Other:     Tests: [x] X-Ray:  3 views of the Right shoulder were ordered, independently visualized by    me, and discussed with patient. Findings: Right shoulder radiographs demonstrate pretty significant    degenerative changes at the acromioclavicular joint with evidence of    supraspinatus footprint tendinitis no obvious fractures or dislocations    are noted on the right shoulder       Impression: Evidence of degenerative changes of the rotator cuff at the    supraspinatus footprint and osteoarthritis of the acromioclavicular joint      [] MRI:  [] Other:    Medications: [x] Refer to full medical record [] None [] Other:  Allergies:      [x] Refer to full medical record [] None [] Other:    Function:  Hand Dominance  [x] Right  [] Left    Gait Prior level of function Current level of function    [x] Independent  [] Assist [x] Independent  [] Assist   Device: [x] Independent [x] Independent    [] Straight Cane [] Quad cane [] Straight Cane [] Quad cane    [] Standard walker [] Rolling walker   [] 4 wheeled walker [] Standard walker [] Rolling walker   [] 4 wheeled walker    [] Wheelchair [] Wheelchair     occupation gutierrez   employ[employer]      pain  yes   location Right shoulder   current: 0-10  2/10   pain at worst  3/10   pain type  dull ache   what makes pain worse  sleeping, playing golf; use of R UE   what makes pain better  raising arm above head   better/worse/same  better   disturbed sleep?   yes       Objective:     ROM  °A/P END FEEL STRENGTH TESTS (+/-) Left Right Not Tested    Left Right  Left Right Drop Arm   []   Sit Shld Flex 155 145    Sulcus Sign   []   Sit Shld Abd      Apprehension   []   Sit Shld IR      Yergasons   []   Shoulder Flex 167/170 160/164  4+ 4 Speeds  EQ []   Ext      Mod Neer  ++ []   ABD    5 5 Prescott    []   ER @ 0 45 90 80 73  5 4- ** Painful Arc  No-has erp []   IR 11  cm T7 11 cm T7  5 5 Tinel   []   Supraspinatus    4+ 4*       Infraspinatus            Serratus Ant            Pectoralis            Tightness  posterior capsule; limited joint play at end range elevation and ER on R    OBSERVATION No Deficit Deficit Not Tested Comments   Forward Head [] [x] []    Rounded Shoulders [] [x] []    Kyphosis [] [x] []    Scapular Height/Position [] [] []    Winging [] [] []    Scapulohumeral Rhythm [] [] []    INSPECTION/PALPATION       SC/AC Joint [] [x] [] Previous clavicular Fx as child   Supraspinatus [] [] []    Biceps tendon/groove [] [x] []    Posterior shld [] [] []    Subscapularis [] [] []    NEUROLOGICAL       Cervical ROM/Quadrant [x] [] [] ROM limited all planes   Reflexes [] [] []    Compression/Distraction [] [] []    Sensation [] [] []      Functional Test: UEFS Score: 73/80   9% functionally impaired     Assessment:       Pt would benefit from skilled PT interventions to decrease pain, increase strength, ROM, and overall functional mobility for improved quality of life. Problems:    [x] ? Pain:  R shoulder pain 2-3/10  [x] ? ROM: limited elevation and ER vs contralateral side  [x] ? Strength: Right shoulder ER, flexion  [x] ? Function: R shoulder pain when playing golf; with use of R UE (dressing, lifting overhead)    STG: (to be met in 7 treatments)  1. ? Pain: reduce R shoulder pain to 1/10 for ADL's and work tasks  2. ? ROM: improve seated elevation to 155°  3. ? Strength: improve strength of ER to 4/5  4. ? Function: able to reach overhead with a decrease in R shoulder pain  5. Patient to be independent with home exercise program as demonstrated by performance with correct form without cues. LTG: (to be met in 12 treatments)  1. Improved sleep per UEFS  2. Improve R shoulder flexion to 165 (supine)  3.  Reduce posterior capsule tightness- IR to 5cm of T7                 Patient goals: decrease annoyance; increase strength    Rehab Potential:  [x] Good  [] Fair  [] Poor   Suggested Professional Referral:  [x] No  [] Yes:  Barriers to Goal Achievement:  [x] No  [] Yes:  Domestic Concerns:  [x] No  [] Yes:    Pt. Education:  [x] Plans/Goals, Risks/Benefits stretching      Evaluation Complexity:  History (Personal factors, comorbidities) [] 0 [x] 1-2 [] 3+   Exam (limitations, restrictions) [x] 1-2 [] 3 [] 4+   Clinical presentation (progression) [x] Stable [] Evolving  [] Unstable   Decision Making [x] Low [] Moderate [] High    [x] Low Complexity [] Moderate Complexity [] High Complexity       Treatment Charges: Mins Units   [x] Evaluation       [x]  Low       []  Moderate       []  High 40 1   []  Modalities     [x]  Ther Exercise 12 1   []  Manual Therapy     []  Ther Activities     []  Aquatics     []  Vasocompression     []  Other       TOTAL TREATMENT TIME: 52 min    Time in: 5:02a    Time Out: 6:02a    Electronically signed by: Claude Hough PT        Physician Signature:________________________________Date:__________________  By signing above or cosigning this note, I have reviewed this plan of care and certify a need for medically necessary rehabilitation services.      *PLEASE SIGN ABOVE AND FAX BACK ALL PAGES*

## 2021-12-06 ENCOUNTER — HOSPITAL ENCOUNTER (OUTPATIENT)
Dept: PHYSICAL THERAPY | Facility: CLINIC | Age: 56
Setting detail: THERAPIES SERIES
Discharge: HOME | End: 2021-12-06
Payer: COMMERCIAL

## 2021-12-06 PROCEDURE — 97110 THERAPEUTIC EXERCISES: CPT

## 2021-12-06 NOTE — FLOWSHEET NOTE
[] [facility]  [address]  [phone]  F: [phone] [x] [address]  [facility]   [address]  P: [phone]  F: [phone] [] [address]  [address]  P: [phone]  F: [phone] [] [address]  P: [phone]  F: [phone] [] [address]  [facility]   [address]   [place]: [phone]  F: [phone]      Physical Therapy Daily Treatment Note    Date:  2021  Patient Name:  Gifty Pearson    :  1965  MRN: [id number]  Physician: Lisseth Sharpe D.O. Insurance: [facility]  Medical Diagnosis: Right rotator cuff tendinitis (M75.81 [ICD-10-CM])                  Rehab Codes: M25.511, M25.611, M79.621  Onset Date: 2021                      Next 's appt: tbd  Visit# / total visits: ; STGs at visit 7     Cancels/No Shows: 0/0    Subjective:    Pain:  [] Yes  [x] No Location: R shoulder  Pain Rating: (0-10 scale) 0/10  Pain altered Tx:  [x] No  [] Yes  Action:    Comments: pt states he has been doing the exercises given at the evaluation and he feels the pain has been less. Objective:  Modalities:   Precautions:  Exercises:  Exercise Reps/Time Weight/Level Comments          UBE 2,2 L3          SUPINE      PROM      Flexion  20x 4# wand    Protraction 20x 4# wand    Chest press  20x 4# wand    ER 20x 4# wand    Horiz. Add 20x 4# wand    Triceps press             SIDE LYING      Sleeper stretch  3x15\"     Abduction  20x 2#    ER 20x 2#    Horiz. Abd 20x 2#          PRONE      Retraction 20x 2#    Extension  20x 2#    Horiz.  Abd  20x 2#    Flexion  20x 2#                STANDING      Flexion  20x 2#    Abduction  20x 2#    scaption       Biceps curls 20x 4# Doorway stretch  3x20\"     IR Towel stretch 5x5\"                 T-Band      Extension  20x Blue     Depression       Retraction  20x Blue     IR      ER  20x Lime                 Other:      Treatment Charges: Mins Units   []  Modalities     [x]  Ther Exercise 49 3   []  Manual Therapy     []  Ther Activities     []  Aquatics     []  Vasocompression     []  Other     Total Treatment time 49 3       Assessment: [x] Progressing toward goals. Able to add exercises today for shoulder strengthening and stretching. Pt able to complete with good form and control. Some cuing for form and to keep shoulders depressed with overhead motions. Minimal fatigue noted at the end of the session with no increased pain. [] No change. [] Other:  [x] Patient would continue to benefit from skilled physical therapy services in order to: decrease pain, increase strength, ROM, and overall functional mobility for improved quality of life. Problems:    [x]? ? Pain:  R shoulder pain 2-3/10  [x]? ? ROM: limited elevation and ER vs contralateral side  [x]? ? Strength: Right shoulder ER, flexion  [x]? ? Function: R shoulder pain when playing golf; with use of R UE (dressing, lifting overhead)     STG: (to be met in 7 treatments)  1. ? Pain: reduce R shoulder pain to 1/10 for ADL's and work tasks  2. ? ROM: improve seated elevation to 155°  3. ? Strength: improve strength of ER to 4/5  4. ? Function: able to reach overhead with a decrease in R shoulder pain  5. Patient to be independent with home exercise program as demonstrated by performance with correct form without cues.     LTG: (to be met in 12 treatments)  1. Improved sleep per UEFS  2. Improve R shoulder flexion to 165 (supine)  3. Reduce posterior capsule tightness- IR to 5cm of T7                 Patient goals: decrease annoyance; increase strength    Pt.  Education:  [x] Yes  [] No  [] Reviewed Prior HEP/Ed  Method of Education: [x] Verbal  [x] Demo for new exercises [] Written  Comprehension of Education:  [x] Verbalizes understanding. [x] Demonstrates understanding. [x] Needs review. [x] Demonstrates/verbalizes HEP/Ed previously given. Plan: [] Continue current frequency toward long and short term goals. [] Specific Instructions for subsequent treatments: capsular stretching/ AAROM/ strengthening/ pec.  Minor stretching      Time In: 2:55            Time Out: 3:48    Electronically signed by:  Charo Knight PTA

## 2021-12-09 ENCOUNTER — HOSPITAL ENCOUNTER (OUTPATIENT)
Dept: PHYSICAL THERAPY | Facility: CLINIC | Age: 56
Setting detail: THERAPIES SERIES
Discharge: HOME | End: 2021-12-09
Payer: COMMERCIAL

## 2021-12-09 PROCEDURE — 97110 THERAPEUTIC EXERCISES: CPT

## 2021-12-09 NOTE — FLOWSHEET NOTE
[] [facility] [facility]  [address]  [phone]  F: [phone] [x] [address]  [facility]   [address]  P: [phone]  F: [phone] [] [address]  [address]  P: [phone]  F: [phone] [] [address]  P: [phone]  F: [phone] [] [address]  [facility]   [address]   [place]: [phone]  F: [phone]      Physical Therapy Daily Treatment Note    Date:  2021  Patient Name:  Godwin Menjivar    :  1965  MRN: [id number]  Physician: Jemima Graham D.O. Insurance: [employer]  Medical Diagnosis: Right rotator cuff tendinitis (M75.81 [ICD-10-CM])                  Rehab Codes: M25.511, M25.611, M79.621  Onset Date: 2021                      Next 's appt: tbd  Visit# / total visits: 3/16; STGs at visit 7     Cancels/No Shows: 0/0    Subjective:    Pain:  [] Yes  [x] No Location: R shoulder  Pain Rating: (0-10 scale) 0/10  Pain altered Tx:  [x] No  [] Yes  Action:    Comments: pt states he states he is having some soreness today, no pain. Objective:  Modalities:   Precautions:  Exercises:  Exercise Reps/Time Weight/Level Comments          UBE 2,2 L3          SUPINE      PROM      Flexion  20x 4# wand    Protraction 20x 4# wand    Chest press  20x 4# wand    ER 20x 4# wand    Horiz. Add 20x 4# wand    Triceps press             SIDE LYING      Sleeper stretch  3x15\"     Abduction  20x 2#    ER 20x 2#    Horiz. Abd 20x 2#          PRONE      Retraction 20x 2#    Extension  20x 2#    Horiz.  Abd  20x 2#    Flexion  20x 2#                STANDING      Flexion  20x 2#    Abduction  20x 2#    scaption  20x 2# Added    Biceps curls 2x20 4# Added a set  Doorway stretch  3x20\"     IR Towel stretch 5x5\"                 T-Band      Extension  20x Blue     Depression       Retraction  20x Blue     IR      ER  20x Blue  Increased 12/9               Other:      Treatment Charges: Mins Units   []  Modalities     [x]  Ther Exercise 46 3   []  Manual Therapy     []  Ther Activities     []  Aquatics     []  Vasocompression     []  Other     Total Treatment time 46 3       Assessment: [x] Progressing toward goals. Pt did well with charted exercises. Did not make many advancements today due to soreness noted at the start of the session. Pt needing cuing for form and to progress through the exercises. Good tolerance to all. [] No change. [] Other:  [x] Patient would continue to benefit from skilled physical therapy services in order to: decrease pain, increase strength, ROM, and overall functional mobility for improved quality of life. Problems:    [x]? ? Pain:  R shoulder pain 2-3/10  [x]? ? ROM: limited elevation and ER vs contralateral side  [x]? ? Strength: Right shoulder ER, flexion  [x]? ? Function: R shoulder pain when playing golf; with use of R UE (dressing, lifting overhead)     STG: (to be met in 7 treatments)  1. ? Pain: reduce R shoulder pain to 1/10 for ADL's and work tasks  2. ? ROM: improve seated elevation to 155°  3. ? Strength: improve strength of ER to 4/5  4. ? Function: able to reach overhead with a decrease in R shoulder pain  5. Patient to be independent with home exercise program as demonstrated by performance with correct form without cues.     LTG: (to be met in 12 treatments)  1. Improved sleep per UEFS  2. Improve R shoulder flexion to 165 (supine)  3. Reduce posterior capsule tightness- IR to 5cm of T7                 Patient goals: decrease annoyance; increase strength    Pt.  Education:  [x] Yes  [] No  [] Reviewed Prior HEP/Ed  Method of Education: [x] Verbal  [x] Demo for new exercises   [x] Written  Access Code: [email or web address]  URL: [email or web address]  Date: 12/09/2021  Prepared by: Anayeli Andersen    Exercises  Supine Shoulder Flexion Extension AAROM with Dowel - 2 x daily - 7 x weekly - 1 sets - 20 reps - no hold  Supine Shoulder External Rotation with Dowel - 2 x daily - 7 x weekly - 1 sets - 20 reps - no hold  Supine Shoulder Abduction AAROM with Dowel - 2 x daily - 7 x weekly - 1 sets - 20 reps - no hold  Sleeper Stretch - 2 x daily - 7 x weekly - 1 sets - 5 reps - 10 hold  Sidelying Shoulder External Rotation - 2 x daily - 7 x weekly - 1 sets - 20 reps - no hold  Sidelying Shoulder Abduction Palm Forward - 2 x daily - 7 x weekly - 1 sets - 20 reps - no hold  Sidelying Shoulder Horizontal Abduction - 2 x daily - 7 x weekly - 1 sets - 20 reps - no hold  Prone Shoulder Horizontal Abduction - 2 x daily - 7 x weekly - 1 sets - 20 reps - no hold  Prone Single Arm Shoulder Y - 2 x daily - 7 x weekly - 1 sets - 20 reps - no hold  Prone Shoulder Extension - Single Arm - 2 x daily - 7 x weekly - 1 sets - 20 reps - no hold  Prone Shoulder Row - 2 x daily - 7 x weekly - 1 sets - 20 reps - no hold  Standing Shoulder Horizontal Abduction with Resistance - 2 x daily - 7 x weekly - 1 sets - 20 reps - no hold  Standing Shoulder Flexion to 90 Degrees - 2 x daily - 7 x weekly - 1 sets - 20 reps - no hold  Shoulder External Rotation and Scapular Retraction with Resistance - 2 x daily - 7 x weekly - 1 sets - 20 reps - no hold  Standing Shoulder Row with Anchored Resistance - 2 x daily - 7 x weekly - 1 sets - 20 reps - no hold  Shoulder Extension with Resistance - 2 x daily - 7 x weekly - 1 sets - 20 reps - no hold    Comprehension of Education:  [x] Verbalizes understanding. [x] Demonstrates understanding. [x] Needs review. [x] Demonstrates/verbalizes HEP/Ed previously given. Plan: [x] Continue current frequency toward long and short term goals.     [x] Specific Instructions for subsequent treatments: capsular stretching/ AAROM/ strengthening/ pec. Minor stretching      Time In: 2:55            Time Out: 3:46    Electronically signed by:  Agnieszka Shipley PTA

## 2021-12-13 ENCOUNTER — HOSPITAL ENCOUNTER (OUTPATIENT)
Dept: PHYSICAL THERAPY | Facility: CLINIC | Age: 56
Setting detail: THERAPIES SERIES
Discharge: HOME | End: 2021-12-13
Payer: COMMERCIAL

## 2021-12-13 PROCEDURE — 97110 THERAPEUTIC EXERCISES: CPT

## 2021-12-13 NOTE — FLOWSHEET NOTE
[] [facility] [address] [facility]  [address]  [phone]  F: [phone] [x] [address]  [facility] [address]   [address]  P: [phone]  F: [phone] [] [place]  [address]  P: [phone]  F: [phone] [] [address]  P: [phone]  F: [phone] [] [address]  [facility]   [address]   [place]: [phone]  F: [phone]      Physical Therapy Daily Treatment Note    Date:  2021  Patient Name:  Anish Jarvis    :  1965  MRN: [id number]  Physician: Meryle Pilsner D.O. Insurance: [facility]  Medical Diagnosis: Right rotator cuff tendinitis (M75.81 [ICD-10-CM])                  Rehab Codes: M25.511, M25.611, M79.621  Onset Date: 2021                      Next 's appt: tbd  Visit# / total visits: ; STGs at visit 7     Cancels/No Shows: 0/0    Subjective:    Pain:  [] Yes  [x] No Location: R shoulder  Pain Rating: (0-10 scale) 0/10  Pain altered Tx:  [x] No  [] Yes  Action:    Comments: pt states he does not have any shoulder pain currently but his pain is intermittent. Objective:  Modalities:   Precautions:  Exercises:  Exercise Reps/Time Weight/Level Comments          UBE 5min L3          SUPINE      PROM      Flexion  20x 5# wand    Protraction 20x 5# wand    Chest press  20x 5# wand    ER 20x 5# wand    Horiz. Add 20x 5# wand    Triceps press             SIDE LYING      Sleeper stretch  3x15\"     Abduction  20x 2#    ER 20x 2#    Horiz. Abd 20x 2#          PRONE      Retraction 20x 2#    Extension  20x 2#    Horiz.  Abd  20x 2#    Flexion  20x 2#                STANDING      Flexion  20x 2#    Abduction  20x 2#    scaption  20x 2# Added 12/   Biceps curls 2x20 4# Added a set 12/9         Doorway stretch  3x20\"     IR Towel stretch 5x5\"                 T-Band      Extension  20x Blue     Depression       Retraction  20x black Increased 12/13   IR      ER  20x Blue  Increased 12/9               Other:      Treatment Charges: Mins Units   []  Modalities     [x]  Ther Exercise 47 3   []  Manual Therapy     []  Ther Activities     []  Aquatics     []  Vasocompression     []  Other     Total Treatment time 47 3       Assessment: [x] Progressing toward goals. Crepitation noted from subacromial space with side lying abduction; good tolerance to all charted Ex's; mild soreness in shoulder reported at completion of ER tband Ex's; advised Pt to monitor post exercise soreness. [] No change. [] Other:  [x] Patient would continue to benefit from skilled physical therapy services in order to: decrease pain, increase strength, ROM, and overall functional mobility for improved quality of life. Problems:    [x]? ? Pain:  R shoulder pain 2-3/10  [x]? ? ROM: limited elevation and ER vs contralateral side  [x]? ? Strength: Right shoulder ER, flexion  [x]? ? Function: R shoulder pain when playing golf; with use of R UE (dressing, lifting overhead)     STG: (to be met in 7 treatments)  1. ? Pain: reduce R shoulder pain to 1/10 for ADL's and work tasks  2. ? ROM: improve seated elevation to 155°  3. ? Strength: improve strength of ER to 4/5  4. ? Function: able to reach overhead with a decrease in R shoulder pain  5. Patient to be independent with home exercise program as demonstrated by performance with correct form without cues.     LTG: (to be met in 12 treatments)  1. Improved sleep per UEFS  2. Improve R shoulder flexion to 165 (supine)  3. Reduce posterior capsule tightness- IR to 5cm of T7                 Patient goals: decrease annoyance; increase strength    Pt.  Education:  [x] Yes  [] No  [] Reviewed Prior HEP/Ed  Method of Education: [x] Verbal  [x] Demo for new exercises [x] Written  Access Code: [email or web address]  URL: [email or web address]  Date: 12/09/2021  Prepared by: Anayeli Andersen    Exercises  Supine Shoulder Flexion Extension AAROM with Dowel - 2 x daily - 7 x weekly - 1 sets - 20 reps - no hold  Supine Shoulder External Rotation with Dowel - 2 x daily - 7 x weekly - 1 sets - 20 reps - no hold  Supine Shoulder Abduction AAROM with Dowel - 2 x daily - 7 x weekly - 1 sets - 20 reps - no hold  Sleeper Stretch - 2 x daily - 7 x weekly - 1 sets - 5 reps - 10 hold  Sidelying Shoulder External Rotation - 2 x daily - 7 x weekly - 1 sets - 20 reps - no hold  Sidelying Shoulder Abduction Palm Forward - 2 x daily - 7 x weekly - 1 sets - 20 reps - no hold  Sidelying Shoulder Horizontal Abduction - 2 x daily - 7 x weekly - 1 sets - 20 reps - no hold  Prone Shoulder Horizontal Abduction - 2 x daily - 7 x weekly - 1 sets - 20 reps - no hold  Prone Single Arm Shoulder Y - 2 x daily - 7 x weekly - 1 sets - 20 reps - no hold  Prone Shoulder Extension - Single Arm - 2 x daily - 7 x weekly - 1 sets - 20 reps - no hold  Prone Shoulder Row - 2 x daily - 7 x weekly - 1 sets - 20 reps - no hold  Standing Shoulder Horizontal Abduction with Resistance - 2 x daily - 7 x weekly - 1 sets - 20 reps - no hold  Standing Shoulder Flexion to 90 Degrees - 2 x daily - 7 x weekly - 1 sets - 20 reps - no hold  Shoulder External Rotation and Scapular Retraction with Resistance - 2 x daily - 7 x weekly - 1 sets - 20 reps - no hold  Standing Shoulder Row with Anchored Resistance - 2 x daily - 7 x weekly - 1 sets - 20 reps - no hold  Shoulder Extension with Resistance - 2 x daily - 7 x weekly - 1 sets - 20 reps - no hold    Comprehension of Education:  [x] Verbalizes understanding. [x] Demonstrates understanding. [x] Needs review. [x] Demonstrates/verbalizes HEP/Ed previously given. Plan: [x] Continue current frequency toward long and short term goals.     [x] Specific Instructions for subsequent treatments: capsular stretching/ AAROM/ strengthening/ pec.  Minor stretching      Time In: 2:03p          Time Out: 2:54p    Electronically signed by:  Tri Cruz PT

## 2021-12-16 ENCOUNTER — HOSPITAL ENCOUNTER (OUTPATIENT)
Dept: PHYSICAL THERAPY | Facility: CLINIC | Age: 56
Setting detail: THERAPIES SERIES
Discharge: HOME | End: 2021-12-16
Payer: COMMERCIAL

## 2021-12-16 PROCEDURE — 97110 THERAPEUTIC EXERCISES: CPT

## 2021-12-16 NOTE — FLOWSHEET NOTE
[] [facility] [address]  [address]  [phone]  F: [phone] [x] [address]  [facility] [address]   [address]  P: [phone]  F: [phone] [] [address]  [address]  P: [phone]  F: [phone] [] [address]  P: [phone]  F: [phone] [] [address]  [facility]   [address]   [place]: [phone]  F: [phone]      Physical Therapy Daily Treatment Note    Date:  2021  Patient Name:  Gifty Pearson    :  1965  MRN: [id number]  Physician: Lisseth Sharpe D.O. Insurance: [facility]  Medical Diagnosis: Right rotator cuff tendinitis (M75.81 [ICD-10-CM])                  Rehab Codes: M25.511, M25.611, M79.621  Onset Date: 2021                      Next 's appt: tbd  Visit# / total visits: ; STGs at visit 7     Cancels/No Shows: 0/0    Subjective:    Pain:  [] Yes  [x] No Location: R shoulder  Pain Rating: (0-10 scale) 0/10  Pain altered Tx:  [x] No  [] Yes  Action:    Comments: pt reports when he does have pain it is right along the tendon (long head biceps) and much more mild than is has been. Objective:  Modalities:   Precautions:  Exercises:  Exercise Reps/Time Weight/Level Comments          UBE 5min L3          SUPINE      PROM      Flexion  30x 5# wand Increased reps 12/16   Protraction 30x 5# wand Increased reps 12/16   Chest press  30x 5# wand Increased reps 12/16   ER 30x 5# wand Increased reps 12/16   Horiz. Add 30x 5# wand Increased reps 12/16   Triceps press             SIDE LYING      Sleeper stretch  3x15\"     Abduction  30x 2# Increased reps 12/16   ER 30x 2# Increased reps 12/16   Horiz.  Abd 30x 2# Increased reps 12/16         PRONE Retraction 25x 3# Increased reps and weight 12/16   Extension  25x 3# Increased reps and weight 12/16   Horiz. Abd  25x 2# Increased reps 12/16   Flexion  25x 2# Increased reps 12/16               STANDING      Flexion  25x 2#    Abduction  25x 2#    scaption  25x 2# Added 12/9   Biceps curls 2x20 5# Added a set 12/9         Doorway stretch  3x20\"     IR Towel stretch 5x5\"                 T-Band      Extension  25x Blue     Depression       Retraction  25x black Increased 12/13   IR      ER  25x Blue  Increased 12/9               Other:      Treatment Charges: Mins Units   []  Modalities     [x]  Ther Exercise 43 3   []  Manual Therapy     []  Ther Activities     []  Aquatics     []  Vasocompression     []  Other     Total Treatment time 43 3       Assessment: [x] Progressing toward goals. Able to increase reps with all exercises and weight with some as charted. Pt did note some increased fatigue with the increased weight, no increased pain noted. [] No change. [] Other:  [x] Patient would continue to benefit from skilled physical therapy services in order to: decrease pain, increase strength, ROM, and overall functional mobility for improved quality of life. Problems:    [x]? ? Pain:  R shoulder pain 2-3/10  [x]? ? ROM: limited elevation and ER vs contralateral side  [x]? ? Strength: Right shoulder ER, flexion  [x]? ? Function: R shoulder pain when playing golf; with use of R UE (dressing, lifting overhead)     STG: (to be met in 7 treatments)  1. ? Pain: reduce R shoulder pain to 1/10 for ADL's and work tasks  2. ? ROM: improve seated elevation to 155°  3. ? Strength: improve strength of ER to 4/5  4. ? Function: able to reach overhead with a decrease in R shoulder pain  5. Patient to be independent with home exercise program as demonstrated by performance with correct form without cues.     LTG: (to be met in 12 treatments)  1. Improved sleep per UEFS  2.  Improve R shoulder flexion to 165 (supine)  3. Reduce posterior capsule tightness- IR to 5cm of T7                 Patient goals: decrease annoyance; increase strength    Pt. Education:  [x] Yes  [] No  [] Reviewed Prior HEP/Ed  Method of Education: [x] Verbal  [x] Demo for new exercises   [x] Written  Access Code: [email or web address]  URL: [email or web address]  Date: 12/09/2021  Prepared by:  Anayeli Andersen    Exercises  Supine Shoulder Flexion Extension AAROM with Dowel - 2 x daily - 7 x weekly - 1 sets - 20 reps - no hold  Supine Shoulder External Rotation with Dowel - 2 x daily - 7 x weekly - 1 sets - 20 reps - no hold  Supine Shoulder Abduction AAROM with Dowel - 2 x daily - 7 x weekly - 1 sets - 20 reps - no hold  Sleeper Stretch - 2 x daily - 7 x weekly - 1 sets - 5 reps - 10 hold  Sidelying Shoulder External Rotation - 2 x daily - 7 x weekly - 1 sets - 20 reps - no hold  Sidelying Shoulder Abduction Palm Forward - 2 x daily - 7 x weekly - 1 sets - 20 reps - no hold  Sidelying Shoulder Horizontal Abduction - 2 x daily - 7 x weekly - 1 sets - 20 reps - no hold  Prone Shoulder Horizontal Abduction - 2 x daily - 7 x weekly - 1 sets - 20 reps - no hold  Prone Single Arm Shoulder Y - 2 x daily - 7 x weekly - 1 sets - 20 reps - no hold  Prone Shoulder Extension - Single Arm - 2 x daily - 7 x weekly - 1 sets - 20 reps - no hold  Prone Shoulder Row - 2 x daily - 7 x weekly - 1 sets - 20 reps - no hold  Standing Shoulder Horizontal Abduction with Resistance - 2 x daily - 7 x weekly - 1 sets - 20 reps - no hold  Standing Shoulder Flexion to 90 Degrees - 2 x daily - 7 x weekly - 1 sets - 20 reps - no hold  Shoulder External Rotation and Scapular Retraction with Resistance - 2 x daily - 7 x weekly - 1 sets - 20 reps - no hold  Standing Shoulder Row with Anchored Resistance - 2 x daily - 7 x weekly - 1 sets - 20 reps - no hold  Shoulder Extension with Resistance - 2 x daily - 7 x weekly - 1 sets - 20 reps - no hold    Comprehension of Education:  [x] [place] understanding. [x] Demonstrates understanding. [x] Needs review. [x] Demonstrates/verbalizes HEP/Ed previously given. Plan: [x] Continue current frequency toward long and short term goals. [x] Specific Instructions for subsequent treatments: capsular stretching/ AAROM/ strengthening/ pec.  Minor stretching      Time In: 1:55p          Time Out: 2:42p    Electronically signed by:  Lobo Silva PTA

## 2021-12-20 ENCOUNTER — HOSPITAL ENCOUNTER (OUTPATIENT)
Dept: PHYSICAL THERAPY | Facility: CLINIC | Age: 56
Setting detail: THERAPIES SERIES
Discharge: HOME | End: 2021-12-20
Payer: COMMERCIAL

## 2021-12-20 PROCEDURE — 97110 THERAPEUTIC EXERCISES: CPT

## 2021-12-20 NOTE — FLOWSHEET NOTE
[] [address]  [address]  [phone]  F: [phone] [x] [address]  [facility]   [address]  P: [phone]  F: [phone] [] [place]  [address]  P: [phone]  F: [phone] [] [address]  P: [phone]  F: [phone] [] [address]  [facility]   [address]   [place]: [phone]  F: [phone]      Physical Therapy Daily Treatment Note    Date:  2021  Patient Name:  John Barber    :  1965  MRN: [id number]  Physician: Alena Otero D.O. Insurance: [employer]  Medical Diagnosis: Right rotator cuff tendinitis (M75.81 [ICD-10-CM])                  Rehab Codes: M25.511, M25.611, M79.621  Onset Date: 2021                      Next 's appt: tbd  Visit# / total visits: ; STGs at visit 7     Cancels/No Shows: 0/0    Subjective:    Pain:  [] Yes  [x] No Location: R shoulder  Pain Rating: (0-10 scale) 0/10  Pain altered Tx:  [x] No  [] Yes  Action:    Comments: pt notes continued improvement; denies any shoulder pain currently    Objective:  Modalities:   Precautions:  Exercises:  Exercise Reps/Time Weight/Level Comments          UBE 5min L3          SUPINE      PROM      Flexion  30x 5# wand Increased reps 1216   Protraction 30x 5# wand Increased reps 1216   Chest press  30x 5# wand Increased reps 16   ER 30x 5# wand Increased reps /16   Horiz. Add 30x 5# wand Increased reps 1216   Triceps press             SIDE LYING      Sleeper stretch  3x15\"     Abduction  30x 2# Increased reps 1216   ER 30x 2# Increased reps /16   Horiz.  Abd 30x 2# Increased reps 16         PRONE      Retraction 25x 3# Increased reps and weight  Extension  25x 3# Increased reps and weight 12/16   Horiz. Abd  25x 2# Increased reps 12/16   Flexion  25x 2# Increased reps 12/16               STANDING      Flexion  25x 2#    Abduction  25x 2#    scaption  25x 2# Added 12/9   Biceps curls 2x20 5# Added a set 12/9         Doorway stretch  3x20\"     IR Towel stretch 5x5\"                 T-Band      Extension  25x Blue     Depression       Retraction  25x black Increased 12/13   IR      ER  25x Blue  Increased 12/9               Other:      Treatment Charges: Mins Units   []  Modalities     [x]  Ther Exercise 44 3   []  Manual Therapy     []  Ther Activities     []  Aquatics     []  Vasocompression     []  Other     Total Treatment time 44 3       Assessment: [x] Progressing toward goals. Kenzie Gusman completes his 6th physical therapy session today. He arrives today without complaints of shoulder pain. R shoulder standing elevation improved to 161° (? 16°); IR improved to 9cm from T7; Er remains weak, however, and he had mild difficulty completing 25 reps with blue tband. I did provide him with blue and black tband for home but advised to stay with lime green for ER if it is difficult completing 25 reps with lime green. [] No change. [] Other:  [x] Patient would continue to benefit from skilled physical therapy services in order to: decrease pain, increase strength, ROM, and overall functional mobility for improved quality of life. Problems:    [x]? ? Pain:  R shoulder pain 2-3/10  [x]? ? ROM: limited elevation and ER vs contralateral side  [x]? ? Strength: Right shoulder ER, flexion  [x]? ?  Function: R shoulder pain when playing golf; with use of R UE (dressing, lifting overhead)     STG: (to be met in 7 treatments) updated 12/20  1. ? Pain: reduce R shoulder pain to 1/10 for ADL's and work tasks goal met  2. ? ROM: improve seated elevation to 155° goal met- 161°  3. ? Strength: improve strength of ER to 4/5- progress made-goal ongoing  4. ? Function: able to reach overhead with a decrease in R shoulder pain progress made-goal ongoing  5. Patient to be independent with home exercise program as demonstrated by performance with correct form without cues. Goal met     LTG: (to be met in 12 treatments)  1. Improved sleep per UEFS  2. Improve R shoulder flexion to 165 (supine)  3. Reduce posterior capsule tightness- IR to 5cm of T7                 Patient goals: decrease annoyance; increase strength    Pt. Education:  [x] Yes  [] No  [] Reviewed Prior HEP/Ed  Method of Education: [x] Verbal  [x] Demo for new exercises   [x] Written  Access Code: [email or web address]  URL: [email or web address]  Date: 12/09/2021  Prepared by:  Anayeli Andersen    Exercises  Supine Shoulder Flexion Extension AAROM with Dowel - 2 x daily - 7 x weekly - 1 sets - 20 reps - no hold  Supine Shoulder External Rotation with Dowel - 2 x daily - 7 x weekly - 1 sets - 20 reps - no hold  Supine Shoulder Abduction AAROM with Dowel - 2 x daily - 7 x weekly - 1 sets - 20 reps - no hold  Sleeper Stretch - 2 x daily - 7 x weekly - 1 sets - 5 reps - 10 hold  Sidelying Shoulder External Rotation - 2 x daily - 7 x weekly - 1 sets - 20 reps - no hold  Sidelying Shoulder Abduction Palm Forward - 2 x daily - 7 x weekly - 1 sets - 20 reps - no hold  Sidelying Shoulder Horizontal Abduction - 2 x daily - 7 x weekly - 1 sets - 20 reps - no hold  Prone Shoulder Horizontal Abduction - 2 x daily - 7 x weekly - 1 sets - 20 reps - no hold  Prone Single Arm Shoulder Y - 2 x daily - 7 x weekly - 1 sets - 20 reps - no hold  Prone Shoulder Extension - Single Arm - 2 x daily - 7 x weekly - 1 sets - 20 reps - no hold  Prone Shoulder Row - 2 x daily - 7 x weekly - 1 sets - 20 reps - no hold  Standing Shoulder Horizontal Abduction with Resistance - 2 x daily - 7 x weekly - 1 sets - 20 reps - no hold  Standing Shoulder Flexion to 90 Degrees - 2 x daily - 7 x weekly - 1 sets - 20 reps - no hold  Shoulder External Rotation and Scapular Retraction with Resistance - 2 x daily - 7 x weekly - 1 sets - 20 reps - no hold  Standing Shoulder Row with Anchored Resistance - 2 x daily - 7 x weekly - 1 sets - 20 reps - no hold  Shoulder Extension with Resistance - 2 x daily - 7 x weekly - 1 sets - 20 reps - no hold    Comprehension of Education:  [x] Verbalizes understanding. [x] Demonstrates understanding. [x] Needs review. [x] Demonstrates/verbalizes HEP/Ed previously given. Plan: [x] Continue current frequency toward long and short term goals. [x] Specific Instructions for subsequent treatments: capsular stretching/ AAROM/ strengthening/ pec.  Minor stretching      Time In: 3p          Time Out: 3:54p    Electronically signed by:  Callie Mitchell PT

## 2021-12-23 ENCOUNTER — HOSPITAL ENCOUNTER (OUTPATIENT)
Dept: PHYSICAL THERAPY | Facility: CLINIC | Age: 56
Setting detail: THERAPIES SERIES
Discharge: HOME | End: 2021-12-23
Payer: COMMERCIAL

## 2021-12-23 PROCEDURE — 97110 THERAPEUTIC EXERCISES: CPT

## 2021-12-23 NOTE — FLOWSHEET NOTE
[] [address]  [address]  [phone]  F: [phone] [x] [address]  [facility]   [address]  P: [phone]  F: [phone] [] [place]  [address]  P: [phone]  F: [phone] [] [address]  P: [phone]  F: [phone] [] [address]  [facility]   [address]   [place]: [phone]  F: [phone]      Physical Therapy Daily Treatment Note    Date:  2021  Patient Name:  Edwin Dowling    :  1965  MRN: [id number]  Physician: Mario Alberto Medina D.O. Insurance: [employer]  Medical Diagnosis: Right rotator cuff tendinitis (M75.81 [ICD-10-CM])                  Rehab Codes: M25.511, M25.611, M79.621  Onset Date: 2021                      Next 's appt: tbd  Visit# / total visits: ; STGs at visit 7     Cancels/No Shows: 0/0    Subjective:    Pain:  [x] Yes  [] No Location: R shoulder  Pain Rating: (0-10 scale) 1/10  Pain altered Tx:  [x] No  [] Yes  Action:    Comments: pt notes only a small twinge in his shoulder this date. Objective:  Modalities:   Precautions:  Exercises:  Exercise Reps/Time Weight/Level Comments          UBE 5min L3          SUPINE      PROM      Flexion  30x 5# wand Increased reps 1216   Protraction 30x 5# wand Increased reps 12/   Chest press  30x 5# wand Increased reps /16   ER 30x 5# wand Increased reps /   Horiz. Add 30x 5# wand Increased reps    Triceps press       1/2 foam roller stretch 30\"x2 EA Minor/major Added          SIDE LYING      Sleeper stretch  3x15\"     Abduction  30x 2# Increased reps 12/   ER 30x 2# Increased reps /   Horiz.  Abd 30x 2# Increased reps /         PRONE Retraction 30x 3# Increased reps 12/232   Extension  30x 3# Increased reps 12/23   Horiz. Abd  30x 2# Increased reps 12/23   Flexion  30x 2# Increased reps 12/23               STANDING      Flexion  25x 2#    Abduction  25x 2#    scaption  25x 2# Added 12/9   Biceps curls 2x20 5# Added a set 12/9         Doorway stretch  3x20\"  Supine instead this date    IR Towel stretch 5x5\"                 T-Band      Extension  25x Blue     Depression       Retraction  25x black Increased 12/13   IR      ER  25x lime Decreased 12/23               Other:      Treatment Charges: Mins Units   []  Modalities     [x]  Ther Exercise 46 3   []  Manual Therapy     []  Ther Activities     []  Aquatics     []  Vasocompression     []  Other     Total Treatment time 46 3       Assessment: [x] Progressing toward goals. Pt reporting he feels improvement overall. Added a supine pectoral stretch (major and minor) this date over a foam roller. Some cuing for postural corrections with standing exercises. Resistance decreased for ER, as patient was not feeling this in the correct muscle and not able to complete the exercise properly. [] No change. [] Other:  [x] Patient would continue to benefit from skilled physical therapy services in order to: decrease pain, increase strength, ROM, and overall functional mobility for improved quality of life. Problems:    [x]? ? Pain:  R shoulder pain 2-3/10  [x]? ? ROM: limited elevation and ER vs contralateral side  [x]? ? Strength: Right shoulder ER, flexion  [x]? ?  Function: R shoulder pain when playing golf; with use of R UE (dressing, lifting overhead)     STG: (to be met in 7 treatments) updated 12/20  1. ? Pain: reduce R shoulder pain to 1/10 for ADL's and work tasks goal met  2. ? ROM: improve seated elevation to 155° goal met- 161°  3. ? Strength: improve strength of ER to 4/5- progress made-goal ongoing  4. ? Function: able to reach overhead with a decrease in R shoulder pain progress made-goal ongoing  5. Patient to be independent with home exercise program as demonstrated by performance with correct form without cues. Goal met     LTG: (to be met in 12 treatments)  1. Improved sleep per UEFS  2. Improve R shoulder flexion to 165 (supine)  3. Reduce posterior capsule tightness- IR to 5cm of T7                 Patient goals: decrease annoyance; increase strength    Pt. Education:  [x] Yes  [] No  [] Reviewed Prior HEP/Ed  Method of Education: [x] Verbal  [x] Demo for new exercises   [x] Written  Access Code: [email or web address]  URL: [email or web address]  Date: 12/09/2021  Prepared by:  Anayeli Andersen    Exercises  Supine Shoulder Flexion Extension AAROM with Dowel - 2 x daily - 7 x weekly - 1 sets - 20 reps - no hold  Supine Shoulder External Rotation with Dowel - 2 x daily - 7 x weekly - 1 sets - 20 reps - no hold  Supine Shoulder Abduction AAROM with Dowel - 2 x daily - 7 x weekly - 1 sets - 20 reps - no hold  Sleeper Stretch - 2 x daily - 7 x weekly - 1 sets - 5 reps - 10 hold  Sidelying Shoulder External Rotation - 2 x daily - 7 x weekly - 1 sets - 20 reps - no hold  Sidelying Shoulder Abduction Palm Forward - 2 x daily - 7 x weekly - 1 sets - 20 reps - no hold  Sidelying Shoulder Horizontal Abduction - 2 x daily - 7 x weekly - 1 sets - 20 reps - no hold  Prone Shoulder Horizontal Abduction - 2 x daily - 7 x weekly - 1 sets - 20 reps - no hold  Prone Single Arm Shoulder Y - 2 x daily - 7 x weekly - 1 sets - 20 reps - no hold  Prone Shoulder Extension - Single Arm - 2 x daily - 7 x weekly - 1 sets - 20 reps - no hold  Prone Shoulder Row - 2 x daily - 7 x weekly - 1 sets - 20 reps - no hold  Standing Shoulder Horizontal Abduction with Resistance - 2 x daily - 7 x weekly - 1 sets - 20 reps - no hold  Standing Shoulder Flexion to 90 Degrees - 2 x daily - 7 x weekly - 1 sets - 20 reps - no hold  Shoulder External Rotation and Scapular Retraction with Resistance - 2 x daily - 7 x weekly - 1 sets - 20 reps - no hold  Standing Shoulder Row with Anchored Resistance - 2 x daily - 7 x weekly - 1 sets - 20 reps - no hold  Shoulder Extension with Resistance - 2 x daily - 7 x weekly - 1 sets - 20 reps - no hold    Comprehension of Education:  [x] Verbalizes understanding. [x] Demonstrates understanding. [x] Needs review. [x] Demonstrates/verbalizes HEP/Ed previously given. Plan: [x] Continue current frequency toward long and short term goals. [x] Specific Instructions for subsequent treatments: capsular stretching/ AAROM/ strengthening/ pec.  Minor stretching      Time In: 3p          Time Out: 3:55p    Electronically signed by:  Alexandra Sicard, PTA

## 2021-12-27 ENCOUNTER — HOSPITAL ENCOUNTER (OUTPATIENT)
Dept: PHYSICAL THERAPY | Facility: CLINIC | Age: 56
Setting detail: THERAPIES SERIES
Discharge: HOME | End: 2021-12-27
Payer: COMMERCIAL

## 2021-12-30 ENCOUNTER — APPOINTMENT (OUTPATIENT)
Dept: PHYSICAL THERAPY | Facility: CLINIC | Age: 56
End: 2021-12-30
Payer: COMMERCIAL

## 2022-02-03 NOTE — DISCHARGE SUMMARY
[] [facility]        Outpatient Physical                Therapy       [address]       Phone: [phone]       Fax: [phone] [x] [facility] at [address]       Phone: [phone]       Fax: [phone] [] [address] [place] [address]     [facility]     Phone: [phone]     Fax:  [phone]     Physical Therapy Discharge Note    Date: 2/3/2022      Patient: John Barber  : 1965  MRN: [id number]    [address]                        Insurance: [address] rotator cuff tendinitis (M75.81 [ICD-10-CM])                  Rehab Codes: M25.511, M25.611, M79.621  Onset Date: 2021                      Next 's appt: tbd  Visit# / total visits: ; STGs at visit 7                                Cancels/No shows:    Date of initial visit: 21                Date of final visit: 21       Discharge Status:     Pt failed to make additional appointments for therapy. Pt. Is now discharged. Electronically signed by: Shyann Young PT    If you have any questions or concerns, please don't hesitate to call.   Thank you for your referral.

## 2022-02-18 DIAGNOSIS — K21.9 GASTROESOPHAGEAL REFLUX DISEASE, UNSPECIFIED WHETHER ESOPHAGITIS PRESENT: ICD-10-CM

## 2022-02-18 RX ORDER — OMEPRAZOLE 20 MG/1
20 CAPSULE, DELAYED RELEASE ORAL DAILY
Qty: 30 CAPSULE | Refills: 3 | Status: SHIPPED | OUTPATIENT
Start: 2022-02-18 | End: 2022-04-14 | Stop reason: SDUPTHER

## 2022-04-14 ENCOUNTER — OFFICE VISIT (OUTPATIENT)
Dept: FAMILY MEDICINE CLINIC | Age: 57
End: 2022-04-14
Payer: COMMERCIAL

## 2022-04-14 VITALS
TEMPERATURE: 97.3 F | OXYGEN SATURATION: 98 % | HEART RATE: 82 BPM | HEIGHT: 72 IN | SYSTOLIC BLOOD PRESSURE: 116 MMHG | BODY MASS INDEX: 28.53 KG/M2 | WEIGHT: 210.6 LBS | DIASTOLIC BLOOD PRESSURE: 88 MMHG

## 2022-04-14 DIAGNOSIS — R06.83 SNORING: ICD-10-CM

## 2022-04-14 DIAGNOSIS — K21.9 GASTROESOPHAGEAL REFLUX DISEASE, UNSPECIFIED WHETHER ESOPHAGITIS PRESENT: ICD-10-CM

## 2022-04-14 DIAGNOSIS — J06.9 VIRAL URI: Primary | ICD-10-CM

## 2022-04-14 PROCEDURE — 99213 OFFICE O/P EST LOW 20 MIN: CPT | Performed by: NURSE PRACTITIONER

## 2022-04-14 RX ORDER — OMEPRAZOLE 20 MG/1
20 CAPSULE, DELAYED RELEASE ORAL DAILY
Qty: 90 CAPSULE | Refills: 1 | Status: SHIPPED | OUTPATIENT
Start: 2022-04-14

## 2022-04-14 RX ORDER — PREDNISONE 10 MG/1
10 TABLET ORAL 2 TIMES DAILY
Qty: 10 TABLET | Refills: 0 | Status: SHIPPED | OUTPATIENT
Start: 2022-04-14 | End: 2022-04-19

## 2022-04-14 SDOH — ECONOMIC STABILITY: FOOD INSECURITY: WITHIN THE PAST 12 MONTHS, THE FOOD YOU BOUGHT JUST DIDN'T LAST AND YOU DIDN'T HAVE MONEY TO GET MORE.: NEVER TRUE

## 2022-04-14 SDOH — ECONOMIC STABILITY: FOOD INSECURITY: WITHIN THE PAST 12 MONTHS, YOU WORRIED THAT YOUR FOOD WOULD RUN OUT BEFORE YOU GOT MONEY TO BUY MORE.: NEVER TRUE

## 2022-04-14 ASSESSMENT — ENCOUNTER SYMPTOMS
TROUBLE SWALLOWING: 0
VOMITING: 0
CHEST TIGHTNESS: 0
SHORTNESS OF BREATH: 0
SINUS PAIN: 1
SINUS PRESSURE: 1
NAUSEA: 0
ABDOMINAL PAIN: 0
SORE THROAT: 0
RHINORRHEA: 0
COUGH: 0

## 2022-04-14 ASSESSMENT — PATIENT HEALTH QUESTIONNAIRE - PHQ9
SUM OF ALL RESPONSES TO PHQ QUESTIONS 1-9: 0
2. FEELING DOWN, DEPRESSED OR HOPELESS: 0
SUM OF ALL RESPONSES TO PHQ QUESTIONS 1-9: 0
1. LITTLE INTEREST OR PLEASURE IN DOING THINGS: 0
SUM OF ALL RESPONSES TO PHQ QUESTIONS 1-9: 0
SUM OF ALL RESPONSES TO PHQ QUESTIONS 1-9: 0
SUM OF ALL RESPONSES TO PHQ9 QUESTIONS 1 & 2: 0

## 2022-04-14 ASSESSMENT — SOCIAL DETERMINANTS OF HEALTH (SDOH): HOW HARD IS IT FOR YOU TO PAY FOR THE VERY BASICS LIKE FOOD, HOUSING, MEDICAL CARE, AND HEATING?: NOT HARD AT ALL

## 2022-04-14 NOTE — PROGRESS NOTES
Visit Information    Have you changed or started any medications since your last visit including any over-the-counter medicines, vitamins, or herbal medicines? no   Have you stopped taking any of your medications? Is so, why? -  no  Are you having any side effects from any of your medications? - no    Have you seen any other physician or provider since your last visit?  no   Have you had any other diagnostic tests since your last visit?  no   Have you been seen in the emergency room and/or had an admission in a hospital since we last saw you?  no   Have you had your routine dental cleaning in the past 6 months?  no     Do you have an active MyChart account? If no, what is the barrier?   Yes    Patient Care Team:  VIJAYA Diaz CNP as PCP - General (Nurse Practitioner)  VIJAYA Diaz CNP as PCP - [facility]    Medical History Review  Past Medical, Family, and Social History reviewed and  contribute to the patient presenting condition    Health Maintenance   Topic Date Due    COVID-19 Vaccine (1) Never done    Shingles Vaccine (2 of 2) 12/10/2020    Depression Screen  03/26/2022    Lipid screen  11/03/2026    DTaP/Tdap/Td vaccine (2 - Td or Tdap) 04/16/2028    Colorectal Cancer Screen  07/08/2029    Flu vaccine  Completed    Hepatitis C screen  Completed    HIV screen  Completed    Hepatitis A vaccine  Aged Out    Hepatitis B vaccine  Aged Out    Hib vaccine  Aged Out    Meningococcal (ACWY) vaccine  Aged Out    Pneumococcal 0-64 years Vaccine  Aged Out

## 2022-04-14 NOTE — PROGRESS NOTES
[facility] SPECIAL[facility]  [address]  [place], [address]  [phone]      Landon Hanna is a 60 y.o. male who presents today for his  medicalconditions/complaints as noted below. Landon Hanna is c/o of Sinus Problem (bad headache yesterday,glands feel swollen,dry nasals,using nasal sinus irrgator,motrin/sudafed,symptoms for a few days, does have hayfever also)  . HPI:    HPI  Pt here today for possible sinus infection. States for the past 2 days he has had sinus headache, head pressure, dry nasal passages, and glands feel full. Denies fever, chills, sob, cough or wheezing. Has had mild dizziness at times. Would like refill sent to mail order for PPI. Stable on current dose. Also would like referral to ENT for snoring and wonders about surgery to fix this.    Past Medical History:   Diagnosis Date    Bronchial spasms 07/08/2019    MAC to LMA with Colonoscopy    Diverticulosis     Hayfever     Hemorrhoid     Sinus infection     finished steroids 2 weeks ago/ now resolved      Past Surgical History:   Procedure Laterality Date    COLONOSCOPY  07/08/2019    Diagnosis: Same; severe diverticulosis at sigmoid colon; external hemorrhoids     COLONOSCOPY N/A 7/8/2019    SCREENING COLONOSCOPY performed by Nathalie Stanley DO at [address] [place] [address] History   Problem Relation Age of Onset    No Known Problems Mother     No Known Problems Father     Cancer Maternal Grandmother         colon     Social History     Tobacco Use    Smoking status: Never Smoker    Smokeless tobacco: Never Used   Substance Use Topics    Alcohol use: Yes      Current Outpatient Medications   Medication Sig Dispense Refill    predniSONE (DELTASONE) 10 MG tablet Take 1 tablet by mouth 2 times daily for 5 days 10 tablet 0    omeprazole (PRILOSEC) 20 MG delayed release capsule Take 1 capsule by mouth Daily 90 capsule 1    silodosin (RAPAFLO) 4 MG CAPS capsule TAKE ONE CAPSULE BY MOUTH EVERY EVENING 90 capsule 3     No current facility-administered medications for this visit. No Known Allergies    Health Maintenance   Topic Date Due    COVID-19 Vaccine (1) Never done    Shingles Vaccine (2 of 2) 12/10/2020    Depression Screen  03/26/2022    Lipid screen  11/03/2026    DTaP/Tdap/Td vaccine (2 - Td or Tdap) 04/16/2028    Colorectal Cancer Screen  07/08/2029    Flu vaccine  Completed    Hepatitis C screen  Completed    HIV screen  Completed    Hepatitis A vaccine  Aged Out    Hepatitis B vaccine  Aged Out    Hib vaccine  Aged Out    Meningococcal (ACWY) vaccine  Aged Out    Pneumococcal 0-64 years Vaccine  Aged Out       Subjective:      Review of Systems   Constitutional: Positive for activity change, appetite change and fatigue. Negative for chills, diaphoresis, fever and unexpected weight change. HENT: Positive for congestion, ear pain, postnasal drip, sinus pressure and sinus pain. Negative for dental problem, drooling, ear discharge, hearing loss, rhinorrhea, sneezing, sore throat and trouble swallowing. Eyes: Negative for visual disturbance. Respiratory: Negative for cough, chest tightness and shortness of breath. Snoring   Cardiovascular: Negative for chest pain, palpitations and leg swelling. Gastrointestinal: Negative for abdominal pain, nausea and vomiting. Skin: Negative for rash. Neurological: Positive for dizziness. Negative for syncope, numbness and headaches. Hematological: Negative for adenopathy. Psychiatric/Behavioral: Negative for sleep disturbance. Objective:      Physical Exam  Vitals and nursing note reviewed. Constitutional:       General: He is not in acute distress. Appearance: Normal appearance. He is well-developed. He is not ill-appearing or diaphoretic.       Comments: /88 (Site: Left Upper Arm, Position: Sitting, Cuff Size: Medium Adult)   Pulse 82   Temp 97.3 °F (36.3 °C) (Tympanic)   Ht 6' (1.829 m)   Wt 210 lb 9.6 oz (95.5 kg)   SpO2 98%   BMI 28.56 kg/m²      HENT:      Head: Normocephalic and atraumatic. Right Ear: Hearing, tympanic membrane, ear canal and external ear normal.      Left Ear: Hearing, tympanic membrane, ear canal and external ear normal.      Nose: Congestion present. Right Turbinates: Enlarged. Left Turbinates: Enlarged. Right Sinus: Maxillary sinus tenderness and frontal sinus tenderness present. Left Sinus: Maxillary sinus tenderness and frontal sinus tenderness present. Mouth/Throat:      Pharynx: Uvula midline. No oropharyngeal exudate. Eyes:      Conjunctiva/sclera: Conjunctivae normal.   Cardiovascular:      Rate and Rhythm: Normal rate and regular rhythm. Heart sounds: Normal heart sounds. Pulmonary:      Effort: Pulmonary effort is normal. No respiratory distress. Breath sounds: Normal breath sounds. No wheezing. Musculoskeletal:      Cervical back: Normal range of motion and neck supple. Lymphadenopathy:      Cervical: No cervical adenopathy. Skin:     General: Skin is warm and dry. Capillary Refill: Capillary refill takes less than 2 seconds. Findings: No rash. Neurological:      General: No focal deficit present. Mental Status: He is alert and oriented to person, place, and time. Mental status is at baseline. Psychiatric:         Mood and Affect: Mood normal.         Behavior: Behavior normal.         Thought Content: Thought content normal.         Judgment: Judgment normal.         Assessment:       Diagnosis Orders   1. Viral URI  predniSONE (DELTASONE) 10 MG tablet   2. Gastroesophageal reflux disease, unspecified whether esophagitis present  omeprazole (PRILOSEC) 20 MG delayed release capsule   3. Snoring  External Referral To ENT       Plan:      Return if symptoms worsen or fail to improve.     Orders Placed This Encounter   Medications    predniSONE (DELTASONE) 10 MG tablet     Sig: Take 1 tablet by mouth 2 times daily for 5 days     Dispense:  10 tablet     Refill:  0    omeprazole (PRILOSEC) 20 MG delayed release capsule     Sig: Take 1 capsule by mouth Daily     Dispense:  90 capsule     Refill:  1     Refer placed for ENT for snoring eval    Refill PPI  LF diet, avoid spicy/greasy foods, alcohol, caffeine, and sit upright after meals for 2-3 hours, small portions of food throughout the day, avoid large meals. Sinus:  Start prednisone as this appears viral at this time  neti pot or sinus rinses per pkg instructions TID PRN  Avoid flonase with dryness  Call INB or worsening  push fluids ( water, Gatorade, tea), and rest as much as able    Patient given educational materials - see patient instructions. Discussed use,benefit, and side effects of prescribed medications. All patient questions answered. Pt voiced understanding. Reviewed health maintenance. Instructed to continue currentmedications, diet and exercise.     Electronically signed by VIJAYA Gomez CNP, CNP on 4/14/2022 at 1:17 PM

## 2022-11-12 DIAGNOSIS — N40.0 BENIGN PROSTATIC HYPERPLASIA WITHOUT LOWER URINARY TRACT SYMPTOMS: ICD-10-CM

## 2022-11-12 RX ORDER — SILODOSIN 4 MG/1
CAPSULE ORAL
Qty: 90 CAPSULE | Refills: 3 | Status: SHIPPED | OUTPATIENT
Start: 2022-11-12

## 2022-12-24 DIAGNOSIS — K21.9 GASTROESOPHAGEAL REFLUX DISEASE, UNSPECIFIED WHETHER ESOPHAGITIS PRESENT: ICD-10-CM

## 2022-12-24 RX ORDER — OMEPRAZOLE 20 MG/1
CAPSULE, DELAYED RELEASE ORAL
Qty: 90 CAPSULE | Refills: 1 | Status: SHIPPED | OUTPATIENT
Start: 2022-12-24

## 2023-09-13 ENCOUNTER — OFFICE VISIT (OUTPATIENT)
Dept: FAMILY MEDICINE CLINIC | Age: 58
End: 2023-09-13
Payer: COMMERCIAL

## 2023-09-13 VITALS
SYSTOLIC BLOOD PRESSURE: 124 MMHG | BODY MASS INDEX: 29.66 KG/M2 | HEIGHT: 72 IN | DIASTOLIC BLOOD PRESSURE: 86 MMHG | TEMPERATURE: 97.7 F | WEIGHT: 219 LBS | OXYGEN SATURATION: 98 % | HEART RATE: 77 BPM

## 2023-09-13 DIAGNOSIS — Z13.220 SCREENING FOR LIPOID DISORDERS: ICD-10-CM

## 2023-09-13 DIAGNOSIS — K21.9 GASTROESOPHAGEAL REFLUX DISEASE, UNSPECIFIED WHETHER ESOPHAGITIS PRESENT: ICD-10-CM

## 2023-09-13 DIAGNOSIS — Z12.5 SPECIAL SCREENING FOR MALIGNANT NEOPLASM OF PROSTATE: ICD-10-CM

## 2023-09-13 DIAGNOSIS — Z00.00 ROUTINE GENERAL MEDICAL EXAMINATION AT A HEALTH CARE FACILITY: Primary | ICD-10-CM

## 2023-09-13 DIAGNOSIS — N40.0 BENIGN PROSTATIC HYPERPLASIA WITHOUT LOWER URINARY TRACT SYMPTOMS: ICD-10-CM

## 2023-09-13 DIAGNOSIS — Z13.1 SCREENING FOR DIABETES MELLITUS: ICD-10-CM

## 2023-09-13 PROCEDURE — 99396 PREV VISIT EST AGE 40-64: CPT | Performed by: NURSE PRACTITIONER

## 2023-09-13 RX ORDER — M-VIT,TX,IRON,MINS/CALC/FOLIC 27MG-0.4MG
1 TABLET ORAL DAILY
COMMUNITY

## 2023-09-13 RX ORDER — OMEPRAZOLE 20 MG/1
20 CAPSULE, DELAYED RELEASE ORAL DAILY
Qty: 90 CAPSULE | Refills: 3 | Status: SHIPPED | OUTPATIENT
Start: 2023-09-13

## 2023-09-13 RX ORDER — SILODOSIN 4 MG/1
CAPSULE ORAL
Qty: 90 CAPSULE | Refills: 3 | Status: SHIPPED | OUTPATIENT
Start: 2023-09-13

## 2023-09-13 SDOH — ECONOMIC STABILITY: FOOD INSECURITY: WITHIN THE PAST 12 MONTHS, THE FOOD YOU BOUGHT JUST DIDN'T LAST AND YOU DIDN'T HAVE MONEY TO GET MORE.: NEVER TRUE

## 2023-09-13 SDOH — ECONOMIC STABILITY: INCOME INSECURITY: HOW HARD IS IT FOR YOU TO PAY FOR THE VERY BASICS LIKE FOOD, HOUSING, MEDICAL CARE, AND HEATING?: NOT HARD AT ALL

## 2023-09-13 SDOH — ECONOMIC STABILITY: HOUSING INSECURITY
IN THE LAST 12 MONTHS, WAS THERE A TIME WHEN YOU DID NOT HAVE A STEADY PLACE TO SLEEP OR SLEPT IN A SHELTER (INCLUDING NOW)?: NO

## 2023-09-13 SDOH — ECONOMIC STABILITY: FOOD INSECURITY: WITHIN THE PAST 12 MONTHS, YOU WORRIED THAT YOUR FOOD WOULD RUN OUT BEFORE YOU GOT MONEY TO BUY MORE.: NEVER TRUE

## 2023-09-13 ASSESSMENT — ENCOUNTER SYMPTOMS
TROUBLE SWALLOWING: 0
CHEST TIGHTNESS: 0
DIARRHEA: 0
RHINORRHEA: 0
BLOOD IN STOOL: 0
CONSTIPATION: 0
ABDOMINAL PAIN: 0
EYE PAIN: 0
NAUSEA: 0
SORE THROAT: 0
BACK PAIN: 0
RECTAL PAIN: 0
COUGH: 0
COLOR CHANGE: 0
SHORTNESS OF BREATH: 0

## 2023-09-13 ASSESSMENT — PATIENT HEALTH QUESTIONNAIRE - PHQ9
1. LITTLE INTEREST OR PLEASURE IN DOING THINGS: 0
SUM OF ALL RESPONSES TO PHQ QUESTIONS 1-9: 0
2. FEELING DOWN, DEPRESSED OR HOPELESS: 0
SUM OF ALL RESPONSES TO PHQ QUESTIONS 1-9: 0
SUM OF ALL RESPONSES TO PHQ9 QUESTIONS 1 & 2: 0

## 2023-09-13 NOTE — PROGRESS NOTES
Visit Information    Have you changed or started any medications since your last visit including any over-the-counter medicines, vitamins, or herbal medicines? no   Have you stopped taking any of your medications? Is so, why? -  no  Are you having any side effects from any of your medications? - no    Have you seen any other physician or provider since your last visit?  no   Have you had any other diagnostic tests since your last visit?  no   Have you been seen in the emergency room and/or had an admission in a hospital since we last saw you?  no   Have you had your routine dental cleaning in the past 6 months?  no     Do you have an active MyChart account? If no, what is the barrier?   Yes    Patient Care Team:  Von [place], APRN - CNP as PCP - General (Nurse Practitioner)  [place]on [place], APRN - CNP as PCP - Empaneled Provider    Medical History Review  Past Medical, Family, and Social History reviewed and  contribute to the patient presenting condition    Health Maintenance   Topic Date Due    COVID-19 Vaccine (1) Never done    Shingles vaccine (2 of 2) 12/10/2020    Depression Screen  04/14/2023    Flu vaccine (1) 08/01/2023    Lipids  11/03/2026    DTaP/Tdap/Td vaccine (2 - Td or Tdap) 04/16/2028    Colorectal Cancer Screen  07/08/2029    Hepatitis C screen  Completed    HIV screen  Completed    Hepatitis A vaccine  Aged Out    Hepatitis B vaccine  Aged Out    Hib vaccine  Aged Out    Meningococcal (ACWY) vaccine  Aged Out    Pneumococcal 0-64 years Vaccine  Aged Out    Diabetes screen  Discontinued    Prostate Specific Antigen (PSA) Screening or Monitoring  Discontinued
2 weeks ago/ now resolved       Past Surgical History:   Procedure Laterality Date    COLONOSCOPY  07/08/2019    Diagnosis: Same; severe diverticulosis at sigmoid colon; external hemorrhoids     COLONOSCOPY N/A 07/08/2019    SCREENING COLONOSCOPY performed by Scarlet Mahmood DO at [address] [place] [address]  10/2022    deviated septum repair       Social History     Socioeconomic History    Marital status:      Spouse name: Not on file    Number of children: Not on file    Years of education: Not on file    Highest education level: Not on file   Occupational History    Not on file   Tobacco Use    Smoking status: Never    Smokeless tobacco: Never   Substance and Sexual Activity    Alcohol use: Yes    Drug use: Not on file    Sexual activity: Not on file   Other Topics Concern    Not on file   Social History Narrative    Not on file     Social Determinants of Health     Financial Resource Strain: Low Risk  (9/13/2023)    Overall Financial Resource Strain (CARDIA)     Difficulty of Paying Living Expenses: Not hard at all   Food Insecurity: No Food Insecurity (9/13/2023)    Hunger Vital Sign     Worried About Running Out of Food in the Last Year: Never true     [address] in the Last Year: Never true   Transportation Needs: Unknown (9/13/2023)    PRAPARE - Transportation     Lack of Transportation (Medical): Not on file     Lack of Transportation (Non-Medical):  No   Physical Activity: Not on file   Stress: Not on file   Social Connections: Not on file   Intimate Partner Violence: Not on file   Housing Stability: Unknown (9/13/2023)    Housing Stability Vital Sign     Unable to Pay for Housing in the Last Year: Not on file     Number of Places Lived in the Last Year: Not on file     Unstable Housing in the Last Year: No        Family History   Problem Relation Age of Onset    No Known Problems Mother     No Known Problems Father     Cancer Maternal Grandmother         colon       ADVANCE DIRECTIVE: N, <no

## 2023-09-16 LAB
ALBUMIN SERPL-MCNC: 4.2 G/DL
ALP BLD-CCNC: 101 U/L
ALT SERPL-CCNC: 29 U/L
ANION GAP SERPL CALCULATED.3IONS-SCNC: NORMAL MMOL/L
AST SERPL-CCNC: 22 U/L
AVERAGE GLUCOSE: NORMAL
BASOPHILS ABSOLUTE: 1.3 /ΜL
BASOPHILS RELATIVE PERCENT: 70 %
BILIRUB SERPL-MCNC: 0.6 MG/DL (ref 0.1–1.4)
BUN BLDV-MCNC: 15 MG/DL
CALCIUM SERPL-MCNC: 9.3 MG/DL
CHLORIDE BLD-SCNC: 104 MMOL/L
CHOLESTEROL, TOTAL: 205 MG/DL
CHOLESTEROL/HDL RATIO: 3.4
CO2: 28 MMOL/L
CREAT SERPL-MCNC: 0.88 MG/DL
EGFR: 98
EOSINOPHILS ABSOLUTE: 6.7 /ΜL
EOSINOPHILS RELATIVE PERCENT: 362 %
GLUCOSE BLD-MCNC: 93 MG/DL
HBA1C MFR BLD: 5.4 %
HCT VFR BLD CALC: 51.9 % (ref 41–53)
HDLC SERPL-MCNC: 60 MG/DL (ref 35–70)
HEMOGLOBIN: 17.3 G/DL (ref 13.5–17.5)
LDL CHOLESTEROL CALCULATED: 122 MG/DL (ref 0–160)
LYMPHOCYTES ABSOLUTE: 31 /ΜL
LYMPHOCYTES RELATIVE PERCENT: 1674 %
MCH RBC QN AUTO: 28.5 PG
MCHC RBC AUTO-ENTMCNC: 33.3 G/DL
MCV RBC AUTO: 85.5 FL
MONOCYTES ABSOLUTE: 9 /ΜL
MONOCYTES RELATIVE PERCENT: 486 %
NEUTROPHILS ABSOLUTE: 52 /ΜL
NEUTROPHILS RELATIVE PERCENT: 2808 %
NONHDLC SERPL-MCNC: 145 MG/DL
PDW BLD-RTO: 13.4 %
PLATELET # BLD: 232 K/ΜL
PMV BLD AUTO: 10.3 FL
POTASSIUM SERPL-SCNC: 4.3 MMOL/L
PROSTATE SPECIFIC ANTIGEN: 3.15 NG/ML
RBC # BLD: 6.07 10^6/ΜL
SODIUM BLD-SCNC: 140 MMOL/L
TOTAL PROTEIN: 6.9
TRIGL SERPL-MCNC: 119 MG/DL
VLDLC SERPL CALC-MCNC: NORMAL MG/DL
WBC # BLD: 5.4 10^3/ML

## 2023-09-19 DIAGNOSIS — Z00.00 ROUTINE GENERAL MEDICAL EXAMINATION AT A HEALTH CARE FACILITY: ICD-10-CM

## 2023-09-19 DIAGNOSIS — Z13.1 SCREENING FOR DIABETES MELLITUS: ICD-10-CM

## 2023-09-19 DIAGNOSIS — Z12.5 SPECIAL SCREENING FOR MALIGNANT NEOPLASM OF PROSTATE: ICD-10-CM

## 2023-09-19 DIAGNOSIS — Z13.220 SCREENING FOR LIPOID DISORDERS: ICD-10-CM

## 2023-09-21 ENCOUNTER — TELEPHONE (OUTPATIENT)
Dept: FAMILY MEDICINE CLINIC | Age: 58
End: 2023-09-21

## 2023-09-21 DIAGNOSIS — G47.9 RESTLESS SLEEPER: ICD-10-CM

## 2023-09-21 DIAGNOSIS — R06.83 SNORING: Primary | ICD-10-CM

## 2023-09-21 DIAGNOSIS — D75.1 POLYCYTHEMIA: ICD-10-CM

## 2023-09-21 DIAGNOSIS — R40.0 DAYTIME SLEEPINESS: ICD-10-CM

## 2023-09-21 NOTE — TELEPHONE ENCOUNTER
Patient came into the office stating that Carmen Masterson discussed possibly ordering a sleep study for the patient and he would like for Carmen Masterson to order that for him at this time. Please advise.

## 2023-10-30 ENCOUNTER — HOSPITAL ENCOUNTER (OUTPATIENT)
Dept: SLEEP CENTER | Age: 58
Discharge: HOME | End: 2023-11-01
Payer: COMMERCIAL

## 2023-10-30 VITALS — BODY MASS INDEX: 29.12 KG/M2 | HEIGHT: 72 IN | WEIGHT: 215 LBS

## 2023-10-30 DIAGNOSIS — D75.1 POLYCYTHEMIA: ICD-10-CM

## 2023-10-30 DIAGNOSIS — R40.0 DAYTIME SLEEPINESS: ICD-10-CM

## 2023-10-30 DIAGNOSIS — R06.83 SNORING: ICD-10-CM

## 2023-10-30 DIAGNOSIS — G47.9 RESTLESS SLEEPER: ICD-10-CM

## 2023-10-30 PROCEDURE — 95810 POLYSOM 6/> YRS 4/> PARAM: CPT

## 2023-11-06 DIAGNOSIS — G47.33 OSA (OBSTRUCTIVE SLEEP APNEA): Primary | ICD-10-CM

## 2023-11-08 ENCOUNTER — OFFICE VISIT (OUTPATIENT)
Dept: FAMILY MEDICINE CLINIC | Age: 58
End: 2023-11-08
Payer: COMMERCIAL

## 2023-11-08 VITALS
BODY MASS INDEX: 29.69 KG/M2 | OXYGEN SATURATION: 98 % | HEART RATE: 80 BPM | HEIGHT: 72 IN | DIASTOLIC BLOOD PRESSURE: 82 MMHG | WEIGHT: 219.2 LBS | SYSTOLIC BLOOD PRESSURE: 120 MMHG | TEMPERATURE: 98.2 F

## 2023-11-08 DIAGNOSIS — G47.33 OSA (OBSTRUCTIVE SLEEP APNEA): ICD-10-CM

## 2023-11-08 DIAGNOSIS — Z87.19 HISTORY OF DIVERTICULITIS: ICD-10-CM

## 2023-11-08 DIAGNOSIS — R19.7 DIARRHEA, UNSPECIFIED TYPE: Primary | ICD-10-CM

## 2023-11-08 PROCEDURE — 99213 OFFICE O/P EST LOW 20 MIN: CPT | Performed by: NURSE PRACTITIONER

## 2023-11-08 RX ORDER — AMOXICILLIN AND CLAVULANATE POTASSIUM 875; 125 MG/1; MG/1
1 TABLET, FILM COATED ORAL 2 TIMES DAILY
Qty: 20 TABLET | Refills: 0 | Status: SHIPPED | OUTPATIENT
Start: 2023-11-08 | End: 2023-11-18

## 2023-11-08 ASSESSMENT — ENCOUNTER SYMPTOMS
COUGH: 0
BLOOD IN STOOL: 0
NAUSEA: 0
CONSTIPATION: 0
CHEST TIGHTNESS: 0
DIARRHEA: 1
VOMITING: 0
SHORTNESS OF BREATH: 0
ABDOMINAL PAIN: 0

## 2023-11-08 NOTE — PROGRESS NOTES
[address] [place], [address]  [phone]      Dante Álvarez is a 59 y.o. male who presents today for his  medicalconditions/complaints as noted below. Dante Álvarez is c/o of Diarrhea (Since last Thur, has hx of diverticulitis , did have popcorn. Usually metamucil helps but not this time)  . HPI:    Diarrhea   Pertinent negatives include no abdominal pain, chills, coughing, fever, headaches or vomiting. Patient here today for evaluation of diarrhea for the last 5 days. States he was having about 6-7 bowel movements a day when it first started and now he is down to approximately 3. States he typically has to go within an hour of eating. Denies nausea, vomiting, dizziness, weakness, abdominal pain, abdominal cramping, blood or mucus in the stool. States he does have a history of diverticulitis several years ago and was seen in the hospital at that time as he was having significant abdominal pain. States he did have popcorn approximately 2 days before his symptoms started this time. His last colonoscopy was in 2019 and told to follow back in 10 years. He has not been taking anything for symptoms. He did try Metamucil with no improvement. He is drinking plenty of fluids and has a normal appetite. Denies any recent travel or antibiotic use. Please note that this chart was generated using voice recognition Dragon dictation software. Although every effort was made to ensure the accuracy of this automated transcription, some errors in transcription may have occurred.     Past Medical History:   Diagnosis Date    Bronchial spasms 07/08/2019    MAC to LMA with Colonoscopy    Diverticulosis     Hayfever     Hemorrhoid     Sinus infection     finished steroids 2 weeks ago/ now resolved      Past Surgical History:   Procedure Laterality Date    COLONOSCOPY  07/08/2019    Diagnosis: Same; severe diverticulosis at sigmoid colon; external hemorrhoids

## 2023-11-16 LAB — STATUS: NORMAL

## 2023-11-21 DIAGNOSIS — G47.33 OSA (OBSTRUCTIVE SLEEP APNEA): Primary | ICD-10-CM

## 2023-12-22 ENCOUNTER — HOSPITAL ENCOUNTER (OUTPATIENT)
Dept: SLEEP CENTER | Age: 58
Discharge: HOME | End: 2023-12-24
Payer: COMMERCIAL

## 2023-12-22 DIAGNOSIS — G47.33 OSA (OBSTRUCTIVE SLEEP APNEA): ICD-10-CM

## 2023-12-22 PROCEDURE — 95811 POLYSOM 6/>YRS CPAP 4/> PARM: CPT

## 2023-12-23 VITALS
WEIGHT: 219 LBS | OXYGEN SATURATION: 97 % | HEART RATE: 57 BPM | BODY MASS INDEX: 31.35 KG/M2 | HEIGHT: 70 IN | RESPIRATION RATE: 16 BRPM

## 2024-01-09 PROBLEM — G47.33 OSA (OBSTRUCTIVE SLEEP APNEA): Status: ACTIVE | Noted: 2024-01-09

## 2024-01-09 LAB — STATUS: NORMAL

## 2024-01-10 DIAGNOSIS — G47.33 OSA (OBSTRUCTIVE SLEEP APNEA): Primary | ICD-10-CM

## 2024-01-12 ENCOUNTER — TELEMEDICINE (OUTPATIENT)
Dept: FAMILY MEDICINE CLINIC | Age: 59
End: 2024-01-12
Payer: COMMERCIAL

## 2024-01-12 ENCOUNTER — PATIENT MESSAGE (OUTPATIENT)
Dept: FAMILY MEDICINE CLINIC | Age: 59
End: 2024-01-12

## 2024-01-12 DIAGNOSIS — G47.33 OSA (OBSTRUCTIVE SLEEP APNEA): Primary | ICD-10-CM

## 2024-01-12 PROCEDURE — 99213 OFFICE O/P EST LOW 20 MIN: CPT | Performed by: NURSE PRACTITIONER

## 2024-01-12 ASSESSMENT — PATIENT HEALTH QUESTIONNAIRE - PHQ9
2. FEELING DOWN, DEPRESSED OR HOPELESS: 0
SUM OF ALL RESPONSES TO PHQ QUESTIONS 1-9: 0
1. LITTLE INTEREST OR PLEASURE IN DOING THINGS: 0
SUM OF ALL RESPONSES TO PHQ QUESTIONS 1-9: 0
SUM OF ALL RESPONSES TO PHQ QUESTIONS 1-9: 0
SUM OF ALL RESPONSES TO PHQ9 QUESTIONS 1 & 2: 0
SUM OF ALL RESPONSES TO PHQ QUESTIONS 1-9: 0

## 2024-01-12 NOTE — TELEPHONE ENCOUNTER
From: Gary Spangler  To: Niyah Hurd  Sent: 1/12/2024 11:48 AM EST  Subject: Dr. Figueredo    Unfortunately Dr. Figueredo dies not do the Inspire procedure. They referred me to [facility].   Thanks Nurse Vickey.

## 2024-01-12 NOTE — PROGRESS NOTES
chance of developing heart conditions  Sleep apnea can increase your risk of heart attack and stroke  What should I do after I recover from surgery?  Sleep  on your side or with the head of your bed elevated  Do not smoke, drink alcohol or take sleep medicines  Make an appointment with your primary care physician  Your primary care physician will examine you to determine if you need any tests such as a sleep study. A sleep study is the best way to find out if you have sleep apnea  You physician will discuss test results and treatment options    No follow-ups on file.       Subjective   HPI  Patient calling in today to discuss recent sleep study report.  States he did stay overnight a second time for CPAP titration study but states the settings and CPAP were not effective at getting his levels to an acceptable reading.  He was advised to follow-up for a BiPAP study and wonders if he needs to do this.  He does see an ENT for nasal concerns and wonders if they would be any help with this problem.was diagnosed with severe KENNEDI>   Review of Systems       Objective   Patient-Reported Vitals  No data recorded     Physical Exam  [INSTRUCTIONS:  \"[x]\" Indicates a positive item  \"[]\" Indicates a negative item  -- DELETE ALL ITEMS NOT EXAMINED]    Constitutional: [x] Appears well-developed and well-nourished [x] No apparent distress      [] Abnormal -     Mental status: [x] Alert and awake  [x] Oriented to person/place/time [x] Able to follow commands    [] Abnormal -     Eyes:   EOM    [x]  Normal    [] Abnormal -   Sclera  [x]  Normal    [] Abnormal -          Discharge [x]  None visible   [] Abnormal -     HENT: [x] Normocephalic, atraumatic  [] Abnormal -   [x] Mouth/Throat: Mucous membranes are moist    External Ears [x] Normal  [] Abnormal -    Neck: [x] No visualized mass [] Abnormal -     Pulmonary/Chest: [x] Respiratory effort normal   [x] No visualized signs of difficulty breathing or respiratory distress        []

## 2024-01-26 ENCOUNTER — HOSPITAL ENCOUNTER (OUTPATIENT)
Dept: SLEEP CENTER | Age: 59
End: 2024-01-26
Payer: COMMERCIAL

## 2024-01-26 DIAGNOSIS — G47.33 OSA (OBSTRUCTIVE SLEEP APNEA): ICD-10-CM

## 2024-01-26 PROCEDURE — 95811 POLYSOM 6/>YRS CPAP 4/> PARM: CPT

## 2024-01-30 LAB — STATUS: NORMAL

## 2024-02-13 ENCOUNTER — TELEPHONE (OUTPATIENT)
Dept: FAMILY MEDICINE CLINIC | Age: 59
End: 2024-02-13

## 2024-02-13 NOTE — TELEPHONE ENCOUNTER
Pt called stating that [facility] has had his rx for 2 weeks and it'll take an extra 3 weeks until they can get him in to get his equipment. Stated that he called [facility] and they could get the same supplies in a week. Asking if the order can go there instead     Please advise

## 2024-05-21 ENCOUNTER — OFFICE VISIT (OUTPATIENT)
Dept: FAMILY MEDICINE CLINIC | Age: 59
End: 2024-05-21
Payer: COMMERCIAL

## 2024-05-21 VITALS
HEART RATE: 74 BPM | HEIGHT: 70 IN | BODY MASS INDEX: 32.47 KG/M2 | SYSTOLIC BLOOD PRESSURE: 124 MMHG | DIASTOLIC BLOOD PRESSURE: 86 MMHG | OXYGEN SATURATION: 98 % | WEIGHT: 226.8 LBS | TEMPERATURE: 97.2 F

## 2024-05-21 DIAGNOSIS — G47.33 OSA (OBSTRUCTIVE SLEEP APNEA): Primary | ICD-10-CM

## 2024-05-21 DIAGNOSIS — J30.2 SEASONAL ALLERGIES: ICD-10-CM

## 2024-05-21 PROCEDURE — 99213 OFFICE O/P EST LOW 20 MIN: CPT | Performed by: NURSE PRACTITIONER

## 2024-05-21 RX ORDER — AZELASTINE 1 MG/ML
1 SPRAY, METERED NASAL 2 TIMES DAILY
Qty: 90 ML | Refills: 1 | Status: SHIPPED | OUTPATIENT
Start: 2024-05-21

## 2024-05-21 ASSESSMENT — ENCOUNTER SYMPTOMS
SINUS PRESSURE: 1
CHEST TIGHTNESS: 0
APNEA: 1
ABDOMINAL PAIN: 0
NAUSEA: 0
COUGH: 0
SHORTNESS OF BREATH: 0
VOMITING: 0

## 2024-05-21 NOTE — PROGRESS NOTES
[facility]  [address] [place] [address]  [phone]      Gary Spagnler is a 58 y.o. male who presents today for his  medicalconditions/complaints as noted below.  Gary Spangler is c/o of Sleep Apnea (Needed appt to have face to face follow up since using Bipap) and Allergies (Requesting nasal spray for allergies, didn't like the flonase, thinks it was astelin?)  .    HPI:    HPI  Patient here today for follow-up since starting BiPAP machine for sleep apnea in March 2024.  States he is wearing this nightly and has seen an improvement in his fatigue and energy levels.  Denies any daytime sleepiness or morning headaches or morning anxiety.  States he is tracking his progress on an giancarlo and seems to have good compliance.    States he would also like Astelin nasal spray called in for seasonal allergies.  He did not like the use of Flonase in the past.      Please note that this chart was generated using voice recognition Dragon dictation software.  Although every effort was made to ensure the accuracy of this automated transcription, some errors in transcription may have occurred.    Past Medical History:   Diagnosis Date    Bronchial spasms 07/08/2019    MAC to LMA with Colonoscopy    Diverticulosis     Hayfever     Headache     Hemorrhoid     Sinus infection     finished steroids 2 weeks ago/ now resolved      Past Surgical History:   Procedure Laterality Date    COLONOSCOPY  07/08/2019    Diagnosis: Same; severe diverticulosis at sigmoid colon; external hemorrhoids     COLONOSCOPY N/A 07/08/2019    SCREENING COLONOSCOPY performed by Ana Maria Pate DO at [facility] OR    COSMETIC SURGERY  1/23    Deviated septom    NOSE SURGERY  10/2022    deviated septum repair     Family History   Problem Relation Age of Onset    No Known Problems Mother     Alcohol Abuse Father     Cancer Maternal Grandmother         colon     Social History     Tobacco Use    Smoking status: Never

## 2024-05-21 NOTE — PROGRESS NOTES
Visit Information    Have you changed or started any medications since your last visit including any over-the-counter medicines, vitamins, or herbal medicines? no   Have you stopped taking any of your medications? Is so, why? -  no  Are you having any side effects from any of your medications? - no    Have you seen any other physician or provider since your last visit?  no   Have you had any other diagnostic tests since your last visit?  no   Have you been seen in the emergency room and/or had an admission in a hospital since we last saw you?  no   Have you had your routine dental cleaning in the past 6 months?  no     Do you have an active MyChart account? If no, what is the barrier?  Yes    Patient Care Team:  Niyah Hurd APRN - CNP as PCP - General (Nurse Practitioner)  Niyah Hurd APRN - CNP as PCP - Empaneled Provider    Medical History Review  Past Medical, Family, and Social History reviewed and  contribute to the patient presenting condition    Health Maintenance   Topic Date Due    COVID-19 Vaccine (1) Never done    Respiratory Syncytial Virus (RSV) Pregnant or age 60 yrs+ (1 - 1-dose 60+ series) Never done    Flu vaccine (Season Ended) 09/13/2024 (Originally 8/1/2024)    Depression Screen  01/12/2025    DTaP/Tdap/Td vaccine (2 - Td or Tdap) 04/16/2028    Lipids  09/15/2028    Colorectal Cancer Screen  07/08/2029    Shingles vaccine  Completed    Hepatitis C screen  Completed    HIV screen  Completed    Hepatitis A vaccine  Aged Out    Hepatitis B vaccine  Aged Out    Hib vaccine  Aged Out    Polio vaccine  Aged Out    Meningococcal (ACWY) vaccine  Aged Out    Pneumococcal 0-64 years Vaccine  Aged Out    Diabetes screen  Discontinued    Prostate Specific Antigen (PSA) Screening or Monitoring  Discontinued

## 2024-09-03 ENCOUNTER — OFFICE VISIT (OUTPATIENT)
Dept: FAMILY MEDICINE CLINIC | Age: 59
End: 2024-09-03
Payer: COMMERCIAL

## 2024-09-03 VITALS
WEIGHT: 229 LBS | TEMPERATURE: 97.4 F | HEIGHT: 70 IN | BODY MASS INDEX: 32.78 KG/M2 | SYSTOLIC BLOOD PRESSURE: 118 MMHG | DIASTOLIC BLOOD PRESSURE: 82 MMHG | HEART RATE: 88 BPM | OXYGEN SATURATION: 98 %

## 2024-09-03 DIAGNOSIS — Z12.5 SPECIAL SCREENING FOR MALIGNANT NEOPLASM OF PROSTATE: ICD-10-CM

## 2024-09-03 DIAGNOSIS — R07.89 CHEST TIGHTNESS: ICD-10-CM

## 2024-09-03 DIAGNOSIS — Z13.220 SCREENING FOR LIPOID DISORDERS: ICD-10-CM

## 2024-09-03 DIAGNOSIS — Z00.00 ENCOUNTER FOR WELL ADULT EXAM WITHOUT ABNORMAL FINDINGS: ICD-10-CM

## 2024-09-03 DIAGNOSIS — Z13.1 SCREENING FOR DIABETES MELLITUS: ICD-10-CM

## 2024-09-03 DIAGNOSIS — Z00.00 ROUTINE GENERAL MEDICAL EXAMINATION AT A HEALTH CARE FACILITY: Primary | ICD-10-CM

## 2024-09-03 PROCEDURE — 93000 ELECTROCARDIOGRAM COMPLETE: CPT | Performed by: NURSE PRACTITIONER

## 2024-09-03 PROCEDURE — 99213 OFFICE O/P EST LOW 20 MIN: CPT | Performed by: NURSE PRACTITIONER

## 2024-09-03 PROCEDURE — 99396 PREV VISIT EST AGE 40-64: CPT | Performed by: NURSE PRACTITIONER

## 2024-09-03 ASSESSMENT — ENCOUNTER SYMPTOMS
BLOOD IN STOOL: 0
ABDOMINAL PAIN: 0
COLOR CHANGE: 0
SORE THROAT: 0
TROUBLE SWALLOWING: 0
DIARRHEA: 0
RECTAL PAIN: 0
SHORTNESS OF BREATH: 0
CONSTIPATION: 0
EYE PAIN: 0
NAUSEA: 0
CHEST TIGHTNESS: 0
RHINORRHEA: 0
COUGH: 0

## 2024-09-03 NOTE — PROGRESS NOTES
Visit Information    Have you changed or started any medications since your last visit including any over-the-counter medicines, vitamins, or herbal medicines? no   Have you stopped taking any of your medications? Is so, why? -  no  Are you having any side effects from any of your medications? - no    Have you seen any other physician or provider since your last visit?  no   Have you had any other diagnostic tests since your last visit?  no   Have you been seen in the emergency room and/or had an admission in a hospital since we last saw you?  no   Have you had your routine dental cleaning in the past 6 months?  no     Do you have an active MyChart account? If no, what is the barrier?  Yes    Patient Care Team:  Niyah Hurd APRN - CNP as PCP - General (Nurse Practitioner)  Niyah Hurd APRN - CNP as PCP - Empaneled Provider    Medical History Review  Past Medical, Family, and Social History reviewed and  contribute to the patient presenting condition    Health Maintenance   Topic Date Due    Respiratory Syncytial Virus (RSV) Pregnant or age 60 yrs+ (1 - 1-dose 60+ series) Never done    Flu vaccine (1) 08/01/2024    COVID-19 Vaccine (1 - 2023-24 season) Never done    Depression Screen  01/12/2025    DTaP/Tdap/Td vaccine (2 - Td or Tdap) 04/16/2028    Lipids  09/15/2028    Colorectal Cancer Screen  07/08/2029    Shingles vaccine  Completed    Hepatitis C screen  Completed    HIV screen  Completed    Hepatitis A vaccine  Aged Out    Hepatitis B vaccine  Aged Out    Hib vaccine  Aged Out    Polio vaccine  Aged Out    Meningococcal (ACWY) vaccine  Aged Out    Pneumococcal 0-64 years Vaccine  Aged Out    Diabetes screen  Discontinued    Prostate Specific Antigen (PSA) Screening or Monitoring  Discontinued             
     Capillary Refill: Capillary refill takes less than 2 seconds.      Findings: No rash.   Neurological:      General: No focal deficit present.      Mental Status: He is alert and oriented to person, place, and time. Mental status is at baseline.      Motor: No weakness or abnormal muscle tone.      Coordination: Coordination normal.      Gait: Gait normal.   Psychiatric:         Mood and Affect: Mood normal.         Behavior: Behavior normal.         Thought Content: Thought content normal.         Judgment: Judgment normal.             Assessment & Plan   Routine general medical examination at a health care facility  Overall he is doing well  Continue on vitamins, allergy nasal spray and rapaflo    -     Lipid Panel; Future  -     CBC with Auto Differential; Future  -     Comprehensive Metabolic Panel; Future  -     Hemoglobin A1C; Future  -     PSA Screening; Future  Screening for lipoid disorders  -     Lipid Panel; Future  Special screening for malignant neoplasm of prostate  -     PSA Screening; Future  Screening for diabetes mellitus  -     Hemoglobin A1C; Future  Encounter for well adult exam without abnormal findings  Chest tightness  Declines stress test and will check CT calcium score for now  EKG today: NSR with incomplete R BBB-which was seen on prev. EKG from 2017, no acute changes  Go to Er if any worsening symptoms    -     CT CARDIAC CALCIUM SCORING; Future  -     EKG 12 Lead        The 10-year ASCVD risk score (Shaniqua BURNS, et al., 2019) is: 7.3%    Values used to calculate the score:      Age: 59 years      Sex: Male      Is Non- : No      Diabetic: No      Tobacco smoker: No      Systolic Blood Pressure: 118 mmHg      Is BP treated: No      HDL Cholesterol: 60 mg/dL      Total Cholesterol: 205 mg/dL  Wt Readings from Last 3 Encounters:   09/03/24 103.9 kg (229 lb)   05/21/24 102.9 kg (226 lb 12.8 oz)   12/23/23 99.3 kg (219 lb)     BP Readings from Last 3 Encounters:

## 2024-09-06 DIAGNOSIS — Z00.00 ROUTINE GENERAL MEDICAL EXAMINATION AT A HEALTH CARE FACILITY: ICD-10-CM

## 2024-09-06 DIAGNOSIS — Z13.220 SCREENING FOR LIPOID DISORDERS: ICD-10-CM

## 2024-09-06 DIAGNOSIS — Z12.5 SPECIAL SCREENING FOR MALIGNANT NEOPLASM OF PROSTATE: ICD-10-CM

## 2024-09-06 DIAGNOSIS — Z13.1 SCREENING FOR DIABETES MELLITUS: ICD-10-CM

## 2024-09-06 LAB
ALBUMIN: 4.5 G/DL
ALP BLD-CCNC: 118 U/L
ALT SERPL-CCNC: 32 U/L
ANION GAP SERPL CALCULATED.3IONS-SCNC: NORMAL MMOL/L
AST SERPL-CCNC: 22 U/L
BASOPHILS ABSOLUTE: 60 /ΜL
BASOPHILS RELATIVE PERCENT: 0.9 %
BILIRUB SERPL-MCNC: 0.8 MG/DL (ref 0.1–1.4)
BUN BLDV-MCNC: 17 MG/DL
CALCIUM SERPL-MCNC: 9.6 MG/DL
CHLORIDE BLD-SCNC: 103 MMOL/L
CHOLESTEROL, TOTAL: 181 MG/DL
CHOLESTEROL/HDL RATIO: 3.5
CO2: 26 MMOL/L
CREAT SERPL-MCNC: 0.95 MG/DL
EOSINOPHILS ABSOLUTE: 268 /ΜL
EOSINOPHILS RELATIVE PERCENT: 4 %
ESTIMATED AVERAGE GLUCOSE: NORMAL
GFR, ESTIMATED: 91
GLUCOSE BLD-MCNC: 84 MG/DL
HBA1C MFR BLD: 5.9 %
HCT VFR BLD CALC: 55.2 % (ref 41–53)
HDLC SERPL-MCNC: 51 MG/DL (ref 35–70)
HEMOGLOBIN: 17.7 G/DL (ref 13.5–17.5)
LDL CHOLESTEROL: 106
LYMPHOCYTES ABSOLUTE: 1608 /ΜL
LYMPHOCYTES RELATIVE PERCENT: 24 %
MCH RBC QN AUTO: 27.5 PG
MCHC RBC AUTO-ENTMCNC: 32.1 G/DL
MCV RBC AUTO: 85.7 FL
MONOCYTES ABSOLUTE: 610 /ΜL
MONOCYTES RELATIVE PERCENT: 9.1 %
NEUTROPHILS ABSOLUTE: 4154 /ΜL
NEUTROPHILS RELATIVE PERCENT: 62 %
NONHDLC SERPL-MCNC: 130 MG/DL
PDW BLD-RTO: 13.5 %
PLATELET # BLD: 261 K/ΜL
PMV BLD AUTO: 10.3 FL
POTASSIUM SERPL-SCNC: 4.6 MMOL/L
PROSTATE SPECIFIC ANTIGEN: 3.06 NG/ML
RBC # BLD: 17.7 10^6/ΜL
SODIUM BLD-SCNC: 141 MMOL/L
TOTAL PROTEIN: 7.3 G/DL (ref 6.4–8.2)
TRIGL SERPL-MCNC: 126 MG/DL
VLDLC SERPL CALC-MCNC: NORMAL MG/DL
WBC # BLD: 6.44 10^3/ML

## 2024-09-10 DIAGNOSIS — D75.1 POLYCYTHEMIA: Primary | ICD-10-CM

## 2024-10-08 ENCOUNTER — TELEPHONE (OUTPATIENT)
Dept: ONCOLOGY | Age: 59
End: 2024-10-08

## 2024-10-08 ENCOUNTER — INITIAL CONSULT (OUTPATIENT)
Dept: ONCOLOGY | Age: 59
End: 2024-10-08
Payer: COMMERCIAL

## 2024-10-08 ENCOUNTER — HOSPITAL ENCOUNTER (OUTPATIENT)
Age: 59
Setting detail: SPECIMEN
Discharge: HOME | End: 2024-10-08
Payer: COMMERCIAL

## 2024-10-08 VITALS
SYSTOLIC BLOOD PRESSURE: 145 MMHG | BODY MASS INDEX: 32.23 KG/M2 | HEART RATE: 87 BPM | WEIGHT: 224.6 LBS | TEMPERATURE: 98 F | DIASTOLIC BLOOD PRESSURE: 107 MMHG | RESPIRATION RATE: 18 BRPM

## 2024-10-08 DIAGNOSIS — D75.1 POLYCYTHEMIA: Primary | ICD-10-CM

## 2024-10-08 DIAGNOSIS — D75.1 POLYCYTHEMIA: ICD-10-CM

## 2024-10-08 LAB
BASOPHILS # BLD: 0.07 K/UL (ref 0–0.2)
BASOPHILS NFR BLD: 1 % (ref 0–2)
COHGB MFR BLD: 2.1 % (ref 0–2)
EOSINOPHIL # BLD: 0.38 K/UL (ref 0–0.44)
EOSINOPHILS RELATIVE PERCENT: 8 % (ref 1–4)
ERYTHROCYTE [DISTWIDTH] IN BLOOD BY AUTOMATED COUNT: 13.3 % (ref 11.8–14.4)
HCT VFR BLD AUTO: 54.2 % (ref 40.7–50.3)
HGB BLD-MCNC: 17.9 G/DL (ref 13–17)
IMM GRANULOCYTES # BLD AUTO: 0.01 K/UL (ref 0–0.3)
IMM GRANULOCYTES NFR BLD: 0 %
LYMPHOCYTES NFR BLD: 1.19 K/UL (ref 1.1–3.7)
LYMPHOCYTES RELATIVE PERCENT: 24 % (ref 24–43)
MCH RBC QN AUTO: 28.5 PG (ref 25.2–33.5)
MCHC RBC AUTO-ENTMCNC: 33 G/DL (ref 28.4–34.8)
MCV RBC AUTO: 86.3 FL (ref 82.6–102.9)
MONOCYTES NFR BLD: 0.45 K/UL (ref 0.1–1.2)
MONOCYTES NFR BLD: 9 % (ref 3–12)
NEUTROPHILS NFR BLD: 58 % (ref 36–65)
NEUTS SEG NFR BLD: 2.91 K/UL (ref 1.5–8.1)
NRBC BLD-RTO: 0 PER 100 WBC
PLATELET # BLD AUTO: 224 K/UL (ref 138–453)
PMV BLD AUTO: 9.8 FL (ref 8.1–13.5)
RBC # BLD AUTO: 6.28 M/UL (ref 4.21–5.77)
V617 MUTATION, PERCENT: NORMAL
V617F MUTATION, QNT: NORMAL
WBC OTHER # BLD: 5 K/UL (ref 3.5–11.3)

## 2024-10-08 PROCEDURE — 85025 COMPLETE CBC W/AUTO DIFF WBC: CPT

## 2024-10-08 PROCEDURE — 82375 ASSAY CARBOXYHB QUANT: CPT

## 2024-10-08 PROCEDURE — 99202 OFFICE O/P NEW SF 15 MIN: CPT | Performed by: INTERNAL MEDICINE

## 2024-10-08 PROCEDURE — 99205 OFFICE O/P NEW HI 60 MIN: CPT | Performed by: INTERNAL MEDICINE

## 2024-10-08 PROCEDURE — 82668 ASSAY OF ERYTHROPOIETIN: CPT

## 2024-10-08 PROCEDURE — 36415 COLL VENOUS BLD VENIPUNCTURE: CPT

## 2024-10-08 PROCEDURE — 81270 JAK2 GENE: CPT

## 2024-10-08 NOTE — PROGRESS NOTES
_           Mr. Gary Spangler is a very pleasant 59 y.o. male with history of multiple co morbidities as listed.  Patient is referred for evaluation and further management of polycythemia.  Patient having some episodes of headaches and migraines.  He had chest discomfort.  No visual disturbances.  No CVA or TIA-like symptoms.  He had labs which showed increased hemoglobin level.  He is referred for further management.  Patient is not on steroids.  He is not on hormone replacement.  Patient is non-smoker.  Patient had history of sleep apnea on BiPAP machine since March 2024.  Patient used to donate blood every 3 months for several years.  Last donation was 1 year ago.  Patient is non-smoker.  Social alcohol drinking.  No family history of blood disorders..       PAST MEDICAL HISTORY: has a past medical history of Bronchial spasms, Diverticulosis, Hayfever, Headache, Hemorrhoid, and Sinus infection.    PAST SURGICAL HISTORY: has a past surgical history that includes Colonoscopy (07/08/2019); Colonoscopy (N/A, 07/08/2019); Nose surgery (10/2022); and Cosmetic surgery (1/23).     CURRENT MEDICATIONS:  has a current medication list which includes the following prescription(s): omeprazole, silodosin, and therapeutic multivitamin-minerals.    ALLERGIES:  has No Known Allergies.    FAMILY HISTORY: Negative for any hematological or oncological conditions.     SOCIAL HISTORY:  reports that he has never smoked. He has never been exposed to tobacco smoke. He has never used smokeless tobacco. He reports current alcohol use of about 5.0 standard drinks of alcohol per week. He reports that he does not use drugs.    REVIEW OF SYSTEMS:     General: Positive for weakness and fatigue. No unanticipated weight loss or decreased appetite. No fever or chills.   Eyes: No blurred vision, eye pain or double vision.   Ears: No hearing problems or

## 2024-10-08 NOTE — TELEPHONE ENCOUNTER
[facility] HERE FOR CONSULT   Labs soon  Call with results and recommendations  LABS ORDERED: CBC Carboxyhemoglobin, Erythropoietin, Jak2 Gene Mutation, Quantitative  TRIAGE TO WATCH FOR RESULTS   FAX SENT TO [facility] TRIAGE   LABS PRINTED AND DONE ON EXIT   AVS PRINTED AND GIVEN ON EXIT

## 2024-10-09 LAB — EPO SERPL-ACNC: 9 MU/ML (ref 4–27)

## 2024-10-14 PROBLEM — D75.1 POLYCYTHEMIA: Status: ACTIVE | Noted: 2024-10-14

## 2024-10-15 LAB
SPECIMEN SOURCE: NORMAL
V617 MUTATION, PERCENT: 0 %
V617F MUTATION, QNT: NOT DETECTED

## 2024-10-18 ENCOUNTER — TELEPHONE (OUTPATIENT)
Dept: INFUSION THERAPY | Age: 59
End: 2024-10-18

## 2024-10-18 NOTE — TELEPHONE ENCOUNTER
Dr. Potts reviewed labs from 10/08/24 he reports reactive polycythemia no malignancy seen RV 4-6 months with CBC.     Patient verbalized understanding with no questions. He is to call [facility] to set up an appt.

## 2024-11-21 DIAGNOSIS — N40.0 BENIGN PROSTATIC HYPERPLASIA WITHOUT LOWER URINARY TRACT SYMPTOMS: ICD-10-CM

## 2024-11-21 RX ORDER — SILODOSIN 4 MG/1
CAPSULE ORAL
Qty: 90 CAPSULE | Refills: 3 | Status: SHIPPED | OUTPATIENT
Start: 2024-11-21

## 2024-12-16 ENCOUNTER — OFFICE VISIT (OUTPATIENT)
Dept: FAMILY MEDICINE CLINIC | Age: 59
End: 2024-12-16
Payer: COMMERCIAL

## 2024-12-16 VITALS
HEART RATE: 83 BPM | HEIGHT: 70 IN | WEIGHT: 230.2 LBS | BODY MASS INDEX: 32.96 KG/M2 | SYSTOLIC BLOOD PRESSURE: 146 MMHG | DIASTOLIC BLOOD PRESSURE: 100 MMHG | TEMPERATURE: 97.3 F | OXYGEN SATURATION: 99 %

## 2024-12-16 DIAGNOSIS — I10 PRIMARY HYPERTENSION: Primary | ICD-10-CM

## 2024-12-16 DIAGNOSIS — R25.3 MUSCLE TWITCH: ICD-10-CM

## 2024-12-16 PROCEDURE — 3077F SYST BP >= 140 MM HG: CPT | Performed by: NURSE PRACTITIONER

## 2024-12-16 PROCEDURE — 3080F DIAST BP >= 90 MM HG: CPT | Performed by: NURSE PRACTITIONER

## 2024-12-16 PROCEDURE — 99214 OFFICE O/P EST MOD 30 MIN: CPT | Performed by: NURSE PRACTITIONER

## 2024-12-16 RX ORDER — VALSARTAN 80 MG/1
80 TABLET ORAL DAILY
Qty: 30 TABLET | Refills: 0 | Status: SHIPPED | OUTPATIENT
Start: 2024-12-16

## 2024-12-16 SDOH — ECONOMIC STABILITY: FOOD INSECURITY: WITHIN THE PAST 12 MONTHS, THE FOOD YOU BOUGHT JUST DIDN'T LAST AND YOU DIDN'T HAVE MONEY TO GET MORE.: NEVER TRUE

## 2024-12-16 SDOH — ECONOMIC STABILITY: INCOME INSECURITY: HOW HARD IS IT FOR YOU TO PAY FOR THE VERY BASICS LIKE FOOD, HOUSING, MEDICAL CARE, AND HEATING?: NOT HARD AT ALL

## 2024-12-16 SDOH — ECONOMIC STABILITY: FOOD INSECURITY: WITHIN THE PAST 12 MONTHS, YOU WORRIED THAT YOUR FOOD WOULD RUN OUT BEFORE YOU GOT MONEY TO BUY MORE.: NEVER TRUE

## 2024-12-16 ASSESSMENT — ENCOUNTER SYMPTOMS
SHORTNESS OF BREATH: 0
VOMITING: 0
APNEA: 1
ABDOMINAL PAIN: 0
COUGH: 0
NAUSEA: 0
CHEST TIGHTNESS: 0
WHEEZING: 0

## 2024-12-16 NOTE — PROGRESS NOTES
Visit Information    Have you changed or started any medications since your last visit including any over-the-counter medicines, vitamins, or herbal medicines? no   Have you stopped taking any of your medications? Is so, why? -  no  Are you having any side effects from any of your medications? - no    Have you seen any other physician or provider since your last visit?  no   Have you had any other diagnostic tests since your last visit?  no   Have you been seen in the emergency room and/or had an admission in a hospital since we last saw you?  no   Have you had your routine dental cleaning in the past 6 months?  no     Do you have an active MyChart account? If no, what is the barrier?  Yes    Patient Care Team:  Niyah Hurd APRN - CNP as PCP - General (Nurse Practitioner)  Niyah Hurd APRN - CNP as PCP - Empaneled Provider    Medical History Review  Past Medical, Family, and Social History reviewed and  contribute to the patient presenting condition    Health Maintenance   Topic Date Due    Flu vaccine (1) 08/01/2024    COVID-19 Vaccine (1 - 2023-24 season) Never done    Depression Screen  01/12/2025    A1C test (Diabetic or Prediabetic)  09/05/2025    DTaP/Tdap/Td vaccine (2 - Td or Tdap) 04/16/2028    Colorectal Cancer Screen  07/08/2029    Lipids  09/05/2029    Respiratory Syncytial Virus (RSV) Pregnant or age 60 yrs+ (1 - 1-dose 75+ series) 06/07/2038    Shingles vaccine  Completed    Hepatitis C screen  Completed    HIV screen  Completed    Hepatitis A vaccine  Aged Out    Hepatitis B vaccine  Aged Out    Hib vaccine  Aged Out    Polio vaccine  Aged Out    Meningococcal (ACWY) vaccine  Aged Out    Pneumococcal 0-64 years Vaccine  Aged Out    Diabetes screen  Discontinued    Prostate Specific Antigen (PSA) Screening or Monitoring  Discontinued

## 2024-12-16 NOTE — PROGRESS NOTES
[facility]  [address] [place] [address]  [phone]      Gary Spangler is a 59 y.o. male who presents today for his  medicalconditions/complaints as noted below.  Gary Spangler is c/o of Blood Pressure Check (Was going to give blood and told BP was too high to donate, this was last Tuesday.  Has been taking omeprazole daily.  No vision issues.  Occasional headache but nothing out of the ordinary for him) and Skin Problem (Feeling a fluttering in both upper legs and arms at times, only lasts for 1-2 seconds)  .    HPI:    HPI  59-year-old male here today for evaluation of elevated blood pressure for the past 1 to 2 months.  States he was at his hematologist in October and blood pressure was elevated then as well as his hemoglobin and hematocrit levels.  He did have additional blood work done and states hematologist was unable to identify a cause.  He does wear a BiPAP machine nightly.  He has had a cold the last few days and states his seal on his mask has not been consistent but prior to that was normal.  He denies any family history of heart disease but states his father did have several strokes but unsure if he had hypertension.  He has not had any changes to his Rapaflo or omeprazole.  States he has gained some weight since retiring last year.  He does not have any chest pain and is tolerating racquetball for exercise well without difficulty.  Denies any headaches or dizziness or swelling.  Has had some fluttering sensations in his upper and lower extremities at times and they last for 1 to 2 seconds when they occur but denies any redness swelling or warmth to any joint. He could cut back on salt/caffeine intake.       Please note that this chart was generated using voice recognition Dragon dictation software.  Although every effort was made to ensure the accuracy of this automated transcription, some errors in transcription may have occurred.    Past Medical History:

## 2024-12-18 LAB
BUN BLDV-MCNC: 17 MG/DL
CALCIUM SERPL-MCNC: 9.4 MG/DL
CHLORIDE BLD-SCNC: 103 MMOL/L
CO2: 24 MMOL/L
CREAT SERPL-MCNC: 0.98 MG/DL
EGFR: 88
GLUCOSE BLD-MCNC: 87 MG/DL
MAGNESIUM: 2.3 MG/DL
POTASSIUM SERPL-SCNC: 4.1 MMOL/L
PTH INTACT: 43
SODIUM BLD-SCNC: 139 MMOL/L
TSH SERPL DL<=0.05 MIU/L-ACNC: 1.09 UIU/ML
VITAMIN B-12: 362

## 2024-12-20 DIAGNOSIS — R25.3 MUSCLE TWITCH: ICD-10-CM

## 2024-12-20 DIAGNOSIS — I10 PRIMARY HYPERTENSION: ICD-10-CM

## 2024-12-31 ENCOUNTER — TELEPHONE (OUTPATIENT)
Dept: FAMILY MEDICINE CLINIC | Age: 59
End: 2024-12-31

## 2024-12-31 ENCOUNTER — PATIENT MESSAGE (OUTPATIENT)
Dept: FAMILY MEDICINE CLINIC | Age: 59
End: 2024-12-31

## 2024-12-31 NOTE — TELEPHONE ENCOUNTER
Gary velasco. Was advised by Niyah to call back in 2 weeks about blood pressure.    He states it is still high.    Please advise.    Thank you.

## 2025-01-01 RX ORDER — VALSARTAN AND HYDROCHLOROTHIAZIDE 160; 12.5 MG/1; MG/1
1 TABLET, FILM COATED ORAL DAILY
Qty: 30 TABLET | Refills: 5 | Status: SHIPPED | OUTPATIENT
Start: 2025-01-01

## 2025-01-13 DIAGNOSIS — I10 PRIMARY HYPERTENSION: ICD-10-CM

## 2025-01-13 RX ORDER — VALSARTAN 80 MG/1
80 TABLET ORAL DAILY
Qty: 30 TABLET | Refills: 0 | OUTPATIENT
Start: 2025-01-13

## 2025-01-16 ENCOUNTER — OFFICE VISIT (OUTPATIENT)
Dept: FAMILY MEDICINE CLINIC | Age: 60
End: 2025-01-16
Payer: COMMERCIAL

## 2025-01-16 VITALS
HEART RATE: 102 BPM | WEIGHT: 234.6 LBS | DIASTOLIC BLOOD PRESSURE: 95 MMHG | SYSTOLIC BLOOD PRESSURE: 132 MMHG | HEIGHT: 70 IN | BODY MASS INDEX: 33.58 KG/M2 | TEMPERATURE: 98.1 F | OXYGEN SATURATION: 96 %

## 2025-01-16 DIAGNOSIS — I10 PRIMARY HYPERTENSION: Primary | ICD-10-CM

## 2025-01-16 DIAGNOSIS — K21.9 GASTROESOPHAGEAL REFLUX DISEASE, UNSPECIFIED WHETHER ESOPHAGITIS PRESENT: ICD-10-CM

## 2025-01-16 PROCEDURE — 3078F DIAST BP <80 MM HG: CPT | Performed by: NURSE PRACTITIONER

## 2025-01-16 PROCEDURE — 99213 OFFICE O/P EST LOW 20 MIN: CPT | Performed by: NURSE PRACTITIONER

## 2025-01-16 PROCEDURE — 3075F SYST BP GE 130 - 139MM HG: CPT | Performed by: NURSE PRACTITIONER

## 2025-01-16 SDOH — ECONOMIC STABILITY: FOOD INSECURITY: WITHIN THE PAST 12 MONTHS, YOU WORRIED THAT YOUR FOOD WOULD RUN OUT BEFORE YOU GOT MONEY TO BUY MORE.: NEVER TRUE

## 2025-01-16 SDOH — ECONOMIC STABILITY: FOOD INSECURITY: WITHIN THE PAST 12 MONTHS, THE FOOD YOU BOUGHT JUST DIDN'T LAST AND YOU DIDN'T HAVE MONEY TO GET MORE.: NEVER TRUE

## 2025-01-16 ASSESSMENT — PATIENT HEALTH QUESTIONNAIRE - PHQ9
SUM OF ALL RESPONSES TO PHQ QUESTIONS 1-9: 0
SUM OF ALL RESPONSES TO PHQ QUESTIONS 1-9: 0
1. LITTLE INTEREST OR PLEASURE IN DOING THINGS: NOT AT ALL
SUM OF ALL RESPONSES TO PHQ QUESTIONS 1-9: 0
2. FEELING DOWN, DEPRESSED OR HOPELESS: NOT AT ALL
SUM OF ALL RESPONSES TO PHQ9 QUESTIONS 1 & 2: 0
SUM OF ALL RESPONSES TO PHQ QUESTIONS 1-9: 0

## 2025-01-16 ASSESSMENT — ENCOUNTER SYMPTOMS
SHORTNESS OF BREATH: 0
NAUSEA: 0
ABDOMINAL PAIN: 0
COUGH: 0
CHEST TIGHTNESS: 0
APNEA: 1
VOMITING: 0

## 2025-01-16 NOTE — PROGRESS NOTES
[facility]  [address] [place] [address]  [phone]      Gary Spangler is a 59 y.o. male who presents today for his  medicalconditions/complaints as noted below.  Gary Spangler is c/o of Hypertension (Brought in home cuff for comparison)      HPI:    HPI   48 patient here today for follow-up on blood pressure elevation recently.  Stay as tolerating valsartan/hydrochlorothiazide 160-12.5 mg well with no side effects.  Denies any chest pain, shortness of breath, dizziness headaches or leg swelling.  States his home machine has been reading normal today but prior to today they have been running 140s over 90s.  He has not had any new stress.  He has been doing therapeutic phlebotomy for polycythemia.  He is compliant with the BiPAP machine and wearing this nightly.  He is not snoring when wearing this.  He did just start Nutrisystem for weight loss on Monday after a shipment delay and is hoping to lose some significant weight to help his blood pressure    Please note that this chart was generated using voice recognition Dragon dictation software.  Although every effort was made to ensure the accuracy of this automated transcription, some errors in transcription may have occurred.    Past Medical History:   Diagnosis Date    Bronchial spasms 07/08/2019    MAC to LMA with Colonoscopy    Diverticulosis     Hayfever     Headache     Hemorrhoid     KENNEDI treated with BiPAP     Sinus infection     finished steroids 2 weeks ago/ now resolved      Past Surgical History:   Procedure Laterality Date    COLONOSCOPY  07/08/2019    Diagnosis: Same; severe diverticulosis at sigmoid colon; external hemorrhoids     COLONOSCOPY N/A 07/08/2019    SCREENING COLONOSCOPY performed by Ana Maria Pate DO at [facility] OR    COSMETIC SURGERY  1/23    Deviated septom    NOSE SURGERY  10/2022    deviated septum repair     Family History   Problem Relation Age of Onset    No Known Problems Mother     Alcohol

## 2025-01-16 NOTE — PROGRESS NOTES
Visit Information    Have you changed or started any medications since your last visit including any over-the-counter medicines, vitamins, or herbal medicines? no   Have you stopped taking any of your medications? Is so, why? -  no  Are you having any side effects from any of your medications? - no    Have you seen any other physician or provider since your last visit?  no   Have you had any other diagnostic tests since your last visit?  no   Have you been seen in the emergency room and/or had an admission in a hospital since we last saw you?  no   Have you had your routine dental cleaning in the past 6 months?  no     Do you have an active MyChart account? If no, what is the barrier?  Yes    Patient Care Team:  Niyah Hurd APRN - CNP as PCP - General (Nurse Practitioner)  Niyah Hurd APRN - CNP as PCP - Empaneled Provider    Medical History Review  Past Medical, Family, and Social History reviewed and  contribute to the patient presenting condition    Health Maintenance   Topic Date Due    COVID-19 Vaccine (1 - 2023-24 season) Never done    Depression Screen  01/12/2025    Flu vaccine (1) 12/16/2025 (Originally 8/1/2024)    A1C test (Diabetic or Prediabetic)  09/05/2025    DTaP/Tdap/Td vaccine (2 - Td or Tdap) 04/16/2028    Colorectal Cancer Screen  07/08/2029    Lipids  09/05/2029    Respiratory Syncytial Virus (RSV) Pregnant or age 60 yrs+ (1 - 1-dose 75+ series) 06/07/2038    Shingles vaccine  Completed    Hepatitis C screen  Completed    HIV screen  Completed    Hepatitis A vaccine  Aged Out    Hepatitis B vaccine  Aged Out    Hib vaccine  Aged Out    Polio vaccine  Aged Out    Meningococcal (ACWY) vaccine  Aged Out    Pneumococcal 0-64 years Vaccine  Aged Out    Diabetes screen  Discontinued    Prostate Specific Antigen (PSA) Screening or Monitoring  Discontinued

## 2025-01-21 ENCOUNTER — HOSPITAL ENCOUNTER (OUTPATIENT)
Age: 60
Setting detail: SPECIMEN
Discharge: HOME | End: 2025-01-21

## 2025-01-21 DIAGNOSIS — I10 PRIMARY HYPERTENSION: ICD-10-CM

## 2025-01-21 LAB
CREAT UR-MCNC: 115 MG/DL (ref 39–259)
TOTAL PROTEIN, URINE: 9 MG/DL
URINE TOTAL PROTEIN CREATININE RATIO: 0.08 (ref 0–0.2)

## 2025-02-25 ENCOUNTER — HOSPITAL ENCOUNTER (OUTPATIENT)
Age: 60
Discharge: HOME | End: 2025-02-27
Payer: COMMERCIAL

## 2025-02-25 DIAGNOSIS — R07.89 CHEST TIGHTNESS: ICD-10-CM

## 2025-02-25 PROCEDURE — 75571 CT HRT W/O DYE W/CA TEST: CPT

## 2025-03-13 ENCOUNTER — OFFICE VISIT (OUTPATIENT)
Dept: FAMILY MEDICINE CLINIC | Age: 60
End: 2025-03-13
Payer: COMMERCIAL

## 2025-03-13 VITALS
SYSTOLIC BLOOD PRESSURE: 108 MMHG | OXYGEN SATURATION: 98 % | BODY MASS INDEX: 30.91 KG/M2 | HEIGHT: 70 IN | DIASTOLIC BLOOD PRESSURE: 72 MMHG | HEART RATE: 86 BPM | TEMPERATURE: 97.6 F | WEIGHT: 215.9 LBS

## 2025-03-13 DIAGNOSIS — Z87.19 HISTORY OF COLONIC DIVERTICULITIS: ICD-10-CM

## 2025-03-13 DIAGNOSIS — R10.32 LLQ PAIN: Primary | ICD-10-CM

## 2025-03-13 PROCEDURE — 99213 OFFICE O/P EST LOW 20 MIN: CPT | Performed by: NURSE PRACTITIONER

## 2025-03-13 ASSESSMENT — ENCOUNTER SYMPTOMS
COUGH: 0
ABDOMINAL DISTENTION: 0
SHORTNESS OF BREATH: 0
ANAL BLEEDING: 0
NAUSEA: 0
BLOOD IN STOOL: 0
ABDOMINAL PAIN: 1
DIARRHEA: 0
CHEST TIGHTNESS: 0
VOMITING: 0
BACK PAIN: 1
CONSTIPATION: 0

## 2025-03-13 NOTE — PROGRESS NOTES
[facility]  [address] [place] [address]  [phone]      Gary Spangler is a 59 y.o. male who presents today for his  medicalconditions/complaints as noted below.  Gary Spangler is c/o of GI Problem (Possible flare up of diverticulosis, has lower back pain in morning but goes away fairly quickly.  Does have constant RLQ pain and more gas than usual.  Went on nutri system and lost weight)  .    HPI:    HPI  Patient here today for evaluation of possible mild diverticulitis flareup.  States he had a significant flareup of this in 2016.  States he has been incorporating more berries and nuts into his diet as he has been working on a healthier low-fat diet for weight loss and down approximately 20 pounds.  He has been playing racSurgeonKidz also recently.  He has had some low back discomforts on a daily basis when he first awakens in the morning but gets better as he moves around throughout the day.  States he has been having some twinges of left lower quadrant abdominal pain and he can feel when food is moving through that part of his colon.  He denies any diarrhea, constipation or blood or mucus in his stool.  Denies fever or chills.      Please note that this chart was generated using voice recognition Dragon dictation software.  Although every effort was made to ensure the accuracy of this automated transcription, some errors in transcription may have occurred.    Past Medical History:   Diagnosis Date    Bronchial spasms 07/08/2019    MAC to LMA with Colonoscopy    Diverticulosis     Hayfever     Headache     Hemorrhoid     KENNEDI treated with BiPAP     Sinus infection     finished steroids 2 weeks ago/ now resolved      Past Surgical History:   Procedure Laterality Date    COLONOSCOPY  07/08/2019    Diagnosis: Same; severe diverticulosis at sigmoid colon; external hemorrhoids     COLONOSCOPY N/A 07/08/2019    SCREENING COLONOSCOPY performed by Ana Maria Pate DO at [facility] OR     Is This A New Presentation, Or A Follow-Up?: Skin Lesion

## 2025-03-13 NOTE — PROGRESS NOTES
Visit Information    Have you changed or started any medications since your last visit including any over-the-counter medicines, vitamins, or herbal medicines? no   Have you stopped taking any of your medications? Is so, why? -  no  Are you having any side effects from any of your medications? - no    Have you seen any other physician or provider since your last visit?  no   Have you had any other diagnostic tests since your last visit?  no   Have you been seen in the emergency room and/or had an admission in a hospital since we last saw you?  no   Have you had your routine dental cleaning in the past 6 months?  no     Do you have an active MyChart account? If no, what is the barrier?  Yes    Patient Care Team:  Niyah Hurd APRN - CNP as PCP - General (Nurse Practitioner)  Niyah Hurd APRN - CNP as PCP - Empaneled Provider    Medical History Review  Past Medical, Family, and Social History reviewed and  contribute to the patient presenting condition    Health Maintenance   Topic Date Due    Pneumococcal 50+ years Vaccine (1 of 1 - PCV) Never done    COVID-19 Vaccine (1 - 2024-25 season) Never done    Flu vaccine (1) 12/16/2025 (Originally 8/1/2024)    A1C test (Diabetic or Prediabetic)  09/05/2025    Depression Screen  01/16/2026    DTaP/Tdap/Td vaccine (2 - Td or Tdap) 04/16/2028    Colorectal Cancer Screen  07/08/2029    Lipids  09/05/2029    Respiratory Syncytial Virus (RSV) Pregnant or age 60 yrs+ (1 - 1-dose 75+ series) 06/07/2038    Shingles vaccine  Completed    Hepatitis C screen  Completed    HIV screen  Completed    Hepatitis A vaccine  Aged Out    Hepatitis B vaccine  Aged Out    Hib vaccine  Aged Out    Polio vaccine  Aged Out    Meningococcal (ACWY) vaccine  Aged Out    Meningococcal B vaccine  Aged Out    Diabetes screen  Discontinued    Prostate Specific Antigen (PSA) Screening or Monitoring  Discontinued

## 2025-04-21 RX ORDER — VALSARTAN AND HYDROCHLOROTHIAZIDE 160; 12.5 MG/1; MG/1
1 TABLET, FILM COATED ORAL DAILY
Qty: 90 TABLET | Refills: 3 | Status: SHIPPED | OUTPATIENT
Start: 2025-04-21

## 2025-04-21 RX ORDER — VALSARTAN AND HYDROCHLOROTHIAZIDE 160; 12.5 MG/1; MG/1
1 TABLET, FILM COATED ORAL DAILY
Qty: 30 TABLET | Refills: 5 | Status: SHIPPED | OUTPATIENT
Start: 2025-04-21 | End: 2025-04-21 | Stop reason: SDUPTHER

## 2025-08-19 ENCOUNTER — OFFICE VISIT (OUTPATIENT)
Dept: FAMILY MEDICINE CLINIC | Age: 60
End: 2025-08-19
Payer: COMMERCIAL

## 2025-08-19 VITALS
SYSTOLIC BLOOD PRESSURE: 108 MMHG | BODY MASS INDEX: 30.72 KG/M2 | HEART RATE: 82 BPM | TEMPERATURE: 97.8 F | DIASTOLIC BLOOD PRESSURE: 72 MMHG | WEIGHT: 214.6 LBS | OXYGEN SATURATION: 98 % | HEIGHT: 70 IN

## 2025-08-19 DIAGNOSIS — I10 PRIMARY HYPERTENSION: Primary | ICD-10-CM

## 2025-08-19 PROCEDURE — 3074F SYST BP LT 130 MM HG: CPT | Performed by: NURSE PRACTITIONER

## 2025-08-19 PROCEDURE — 3078F DIAST BP <80 MM HG: CPT | Performed by: NURSE PRACTITIONER

## 2025-08-19 PROCEDURE — 99213 OFFICE O/P EST LOW 20 MIN: CPT | Performed by: NURSE PRACTITIONER

## 2025-08-19 RX ORDER — VALSARTAN AND HYDROCHLOROTHIAZIDE 80; 12.5 MG/1; MG/1
1 TABLET, FILM COATED ORAL DAILY
Qty: 30 TABLET | Refills: 0 | Status: SHIPPED | OUTPATIENT
Start: 2025-08-19

## 2025-08-19 ASSESSMENT — ENCOUNTER SYMPTOMS
CHEST TIGHTNESS: 0
SHORTNESS OF BREATH: 0
ABDOMINAL PAIN: 0
NAUSEA: 0
VOMITING: 0
COUGH: 0